# Patient Record
Sex: FEMALE | Race: WHITE | NOT HISPANIC OR LATINO | Employment: UNEMPLOYED | URBAN - NONMETROPOLITAN AREA
[De-identification: names, ages, dates, MRNs, and addresses within clinical notes are randomized per-mention and may not be internally consistent; named-entity substitution may affect disease eponyms.]

---

## 2024-02-07 ENCOUNTER — ROUTINE PRENATAL (OUTPATIENT)
Dept: OBSTETRICS AND GYNECOLOGY | Age: 24
End: 2024-02-07
Payer: MEDICAID

## 2024-02-07 VITALS — SYSTOLIC BLOOD PRESSURE: 132 MMHG | BODY MASS INDEX: 45.21 KG/M2 | DIASTOLIC BLOOD PRESSURE: 76 MMHG | WEIGHT: 293 LBS

## 2024-02-07 DIAGNOSIS — Z34.03 PRIMIGRAVIDA, THIRD TRIMESTER: ICD-10-CM

## 2024-02-07 DIAGNOSIS — Z71.85 IMMUNIZATION COUNSELING: ICD-10-CM

## 2024-02-07 DIAGNOSIS — Z11.3 ROUTINE SCREENING FOR STI (SEXUALLY TRANSMITTED INFECTION): ICD-10-CM

## 2024-02-07 DIAGNOSIS — Z13.1 SPECIAL SCREENING EXAMINATION FOR DIABETES MELLITUS: ICD-10-CM

## 2024-02-07 DIAGNOSIS — Z3A.27 27 WEEKS GESTATION OF PREGNANCY: Primary | ICD-10-CM

## 2024-02-07 DIAGNOSIS — Z13.0 SCREENING FOR DEFICIENCY ANEMIA: ICD-10-CM

## 2024-02-07 DIAGNOSIS — K02.9 DENTAL DECAY: ICD-10-CM

## 2024-02-07 LAB
GLUCOSE UR STRIP-MCNC: NEGATIVE MG/DL
PROT UR STRIP-MCNC: NEGATIVE MG/DL

## 2024-02-07 PROCEDURE — 99214 OFFICE O/P EST MOD 30 MIN: CPT | Performed by: ADVANCED PRACTICE MIDWIFE

## 2024-02-07 RX ORDER — AMOXICILLIN AND CLAVULANATE POTASSIUM 875; 125 MG/1; MG/1
1 TABLET, FILM COATED ORAL 2 TIMES DAILY
Qty: 14 TABLET | Refills: 0 | Status: SHIPPED | OUTPATIENT
Start: 2024-02-07

## 2024-02-07 NOTE — PROGRESS NOTES
Reason for visit: Routine OB visit at 27w2d     CC:  She is having increased dental pain. Endorses good fetal movement. Denies VB, LOF, contractions, h/a, visual changes, and right upper quadrant pain.     ROS: All systems reviewed and are negative with exception of the following: dental pain    Wt 293 lb for a TWG of 9.072 kg (20 lb), /76, FHTs 145  Urine today and reviewed: negative glucose, negative protein    19-week Anatomy scan: normal; anterior placenta with no evidence of placenta previa    Exam:  General Appearance:  No visualized signs of distress. Normal mood and behavior. Dental caries noted. Erythema of the gingiva.   Cardiorespiratory: HR str and reg. Lungs clear. Resp even and unlabored.  Abdomen: is soft and nontender. No CVA tenderness.   Ext: No edema. Calves non-tender.     Impression  Diagnoses and all orders for this visit:    1. 27 weeks gestation of pregnancy (Primary)  -     POC Urinalysis Dipstick  -     Gestational Screen 1 Hr ([facility])  -     Hemoglobin  -     RPR, Rfx Qn RPR / Confirm TP  -     amoxicillin-clavulanate (AUGMENTIN) 875-125 MG per tablet; Take 1 tablet by mouth 2 (Two) Times a Day.  Dispense: 14 tablet; Refill: 0    2. Primigravida, third trimester  Discussed third trimester of pregnancy, including discomforts and measures of support,  labor warnings, preeclampsia warnings, and signs and symptoms to report. Discussed glucose and hemoglobin screenings which were due to be completed today in the clinic. Labs ordered today, but she will return for the labs to be completed. Patient to notify provider and/or come to the hospital for vaginal bleeding, leaking of fluid, contractions, or other concerns. Third Trimester of Pregnancy video included in the AVS.    3. Special screening examination for diabetes mellitus  -     Gestational Screen 1 Hr ([facility])    4. Screening for deficiency anemia  -     Hemoglobin    5. Routine screening for STI (sexually transmitted  infection)  -     RPR, Rfx Qn RPR / Confirm TP    6. Dental decay  Stressed the importance of dental care/hygiene, especially during pregnancy. Antibiotic discussed and sent to the pharmacy. Discussed warm saltwater swishes.   -     amoxicillin-clavulanate (AUGMENTIN) 875-125 MG per tablet; Take 1 tablet by mouth 2 (Two) Times a Day.  Dispense: 14 tablet; Refill: 0    7. Immunization counseling  Reviewed Tdap immunization recommendations during pregnancy. Patient to consider receiving the Tdap immunization during her next prenatal visit. Tdap (Tetanus, Diptheria, Pertussis) Vaccine: What You Need to Know (VIS) education included in the AVS.          Return to the office in 2 weeks for routine prenatal visit with Dr. Carmona and as needed with concerns.        This note has been signed electronically.    Anna Hyde, DNP, APRN, CNM, RNC-OB

## 2024-02-13 ENCOUNTER — PATIENT OUTREACH (OUTPATIENT)
Dept: LABOR AND DELIVERY | Facility: HOSPITAL | Age: 24
End: 2024-02-13
Payer: MEDICAID

## 2024-02-21 ENCOUNTER — ROUTINE PRENATAL (OUTPATIENT)
Dept: OBSTETRICS AND GYNECOLOGY | Age: 24
End: 2024-02-21
Payer: MEDICAID

## 2024-02-21 VITALS — DIASTOLIC BLOOD PRESSURE: 84 MMHG | BODY MASS INDEX: 44.35 KG/M2 | SYSTOLIC BLOOD PRESSURE: 132 MMHG | WEIGHT: 287.4 LBS

## 2024-02-21 DIAGNOSIS — Z3A.29 29 WEEKS GESTATION OF PREGNANCY: Primary | ICD-10-CM

## 2024-02-21 DIAGNOSIS — Z71.85 IMMUNIZATION COUNSELING: ICD-10-CM

## 2024-02-21 DIAGNOSIS — O99.613 CONSTIPATION DURING PREGNANCY IN THIRD TRIMESTER: ICD-10-CM

## 2024-02-21 DIAGNOSIS — O99.210 OBESITY AFFECTING PREGNANCY, ANTEPARTUM, UNSPECIFIED OBESITY TYPE: ICD-10-CM

## 2024-02-21 DIAGNOSIS — Z34.03 PRIMIGRAVIDA, THIRD TRIMESTER: ICD-10-CM

## 2024-02-21 DIAGNOSIS — K59.00 CONSTIPATION DURING PREGNANCY IN THIRD TRIMESTER: ICD-10-CM

## 2024-02-21 LAB
GLUCOSE UR STRIP-MCNC: NEGATIVE MG/DL
PROT UR STRIP-MCNC: ABNORMAL MG/DL

## 2024-02-21 RX ORDER — DOCUSATE SODIUM 100 MG/1
100 CAPSULE, LIQUID FILLED ORAL 2 TIMES DAILY
Qty: 60 CAPSULE | Refills: 1 | Status: SHIPPED | OUTPATIENT
Start: 2024-02-21

## 2024-02-21 NOTE — PROGRESS NOTES
Constipation. Endorses appropriate fetal movement. Denies regular contractions, leakage of fluid, vaginal bleeding or discharge. GTT tomorrow - patient instructed in test.     Kick counts/Labor precautions  RTC 2 weeks with growth 2/2 tobacco and BMI    Diagnoses and all orders for this visit:    1. 29 weeks gestation of pregnancy (Primary)  -     POC Urinalysis Dipstick    2. Primigravida, third trimester    3. Immunization counseling  -     Tdap Vaccine => 8yo IM (BOOSTRIX)    4. Constipation during pregnancy in third trimester  -     docusate sodium (Colace) 100 MG capsule; Take 1 capsule by mouth 2 (Two) Times a Day.  Dispense: 60 capsule; Refill: 1    5. Obesity affecting pregnancy, antepartum, unspecified obesity type

## 2024-02-22 DIAGNOSIS — Z3A.29 29 WEEKS GESTATION OF PREGNANCY: Primary | ICD-10-CM

## 2024-02-23 LAB
ERYTHROCYTE [DISTWIDTH] IN BLOOD BY AUTOMATED COUNT: 11.9 % (ref 11.7–15.4)
GLUCOSE 1H P 50 G GLC PO SERPL-MCNC: 91 MG/DL (ref 70–139)
HBV SURFACE AG SERPL QL IA: NEGATIVE
HCT VFR BLD AUTO: 37.2 % (ref 34–46.6)
HCV IGG SERPL QL IA: NON REACTIVE
HGB BLD-MCNC: 12 G/DL (ref 11.1–15.9)
HIV 1+2 AB+HIV1 P24 AG SERPL QL IA: NON REACTIVE
MCH RBC QN AUTO: 29.8 PG (ref 26.6–33)
MCHC RBC AUTO-ENTMCNC: 32.3 G/DL (ref 31.5–35.7)
MCV RBC AUTO: 92 FL (ref 79–97)
PLATELET # BLD AUTO: 297 X10E3/UL (ref 150–450)
RBC # BLD AUTO: 4.03 X10E6/UL (ref 3.77–5.28)
RPR SER QL: NON REACTIVE
WBC # BLD AUTO: 13.4 X10E3/UL (ref 3.4–10.8)

## 2024-03-06 ENCOUNTER — ROUTINE PRENATAL (OUTPATIENT)
Dept: OBSTETRICS AND GYNECOLOGY | Age: 24
End: 2024-03-06
Payer: MEDICAID

## 2024-03-06 VITALS — BODY MASS INDEX: 44.84 KG/M2 | WEIGHT: 290.6 LBS | SYSTOLIC BLOOD PRESSURE: 128 MMHG | DIASTOLIC BLOOD PRESSURE: 84 MMHG

## 2024-03-06 DIAGNOSIS — Z34.03 PRIMIGRAVIDA, THIRD TRIMESTER: ICD-10-CM

## 2024-03-06 DIAGNOSIS — Z3A.31 31 WEEKS GESTATION OF PREGNANCY: Primary | ICD-10-CM

## 2024-03-06 DIAGNOSIS — O99.210 OBESITY AFFECTING PREGNANCY, ANTEPARTUM, UNSPECIFIED OBESITY TYPE: ICD-10-CM

## 2024-03-06 LAB
GLUCOSE UR STRIP-MCNC: NEGATIVE MG/DL
PROT UR STRIP-MCNC: NEGATIVE MG/DL

## 2024-03-06 NOTE — PROGRESS NOTES
No complaints. Endorses appropriate fetal movement. Denies regular contractions, leakage of fluid, vaginal bleeding or discharge.    US today:  cephalic, anterior placenta, GIANNA 9.1 cm (MVP 3.5 cm)  EFW 1661 g (27%, AC 20%)    Kick counts/Labor precautions  RTC 2 weeks  Repeat growth 37-38 weeks secondary to BMI    Diagnoses and all orders for this visit:    1. 31 weeks gestation of pregnancy (Primary)  -     POC Urinalysis Dipstick    2. Obesity affecting pregnancy, antepartum, unspecified obesity type

## 2024-03-07 LAB
AMPHETAMINES UR QL SCN: NEGATIVE NG/ML
BARBITURATES UR QL SCN: NEGATIVE NG/ML
BENZODIAZ UR QL SCN: NEGATIVE NG/ML
BZE UR QL SCN: NEGATIVE NG/ML
CANNABINOIDS UR QL SCN: POSITIVE NG/ML
CREAT UR-MCNC: 201.6 MG/DL (ref 20–300)
LABORATORY COMMENT REPORT: ABNORMAL
METHADONE UR QL SCN: NEGATIVE NG/ML
OPIATES UR QL SCN: NEGATIVE NG/ML
OXYCODONE+OXYMORPHONE UR QL SCN: NEGATIVE NG/ML
PCP UR QL: NEGATIVE NG/ML
PH UR: 7.5 [PH] (ref 4.5–8.9)
PROPOXYPH UR QL SCN: NEGATIVE NG/ML

## 2024-03-20 ENCOUNTER — TELEPHONE (OUTPATIENT)
Dept: OBSTETRICS AND GYNECOLOGY | Age: 24
End: 2024-03-20
Payer: MEDICAID

## 2024-03-20 ENCOUNTER — ROUTINE PRENATAL (OUTPATIENT)
Dept: OBSTETRICS AND GYNECOLOGY | Age: 24
End: 2024-03-20
Payer: MEDICAID

## 2024-03-20 DIAGNOSIS — Z53.21 PATIENT LEFT WITHOUT BEING SEEN: ICD-10-CM

## 2024-03-20 DIAGNOSIS — Z3A.33 33 WEEKS GESTATION OF PREGNANCY: Primary | ICD-10-CM

## 2024-03-20 NOTE — TELEPHONE ENCOUNTER
Patient checked in for her appointment ,and came back upfront after being checked in for about 20 min. Saying she was going to leave because she didn't have time to wait. I went back to talk to Anna and Tonie to let them know what she said. I was told to offer her to see Kristine. When I offered her to see Dr. Carmona she then said no because all they was going to do, is call her back and leave her in a room for another 20 min, then come in and check  baby's heartbeat. I told her she really should stay for her appointment, to make sure everything was good with baby. She stated he's good I feel him moving all the time I will see Kristine in two weeks.

## 2024-04-03 ENCOUNTER — ROUTINE PRENATAL (OUTPATIENT)
Dept: OBSTETRICS AND GYNECOLOGY | Age: 24
End: 2024-04-03
Payer: MEDICAID

## 2024-04-03 VITALS — BODY MASS INDEX: 45.06 KG/M2 | WEIGHT: 292 LBS | DIASTOLIC BLOOD PRESSURE: 86 MMHG | SYSTOLIC BLOOD PRESSURE: 136 MMHG

## 2024-04-03 DIAGNOSIS — O99.210 OBESITY AFFECTING PREGNANCY, ANTEPARTUM, UNSPECIFIED OBESITY TYPE: ICD-10-CM

## 2024-04-03 DIAGNOSIS — Z3A.35 35 WEEKS GESTATION OF PREGNANCY: Primary | ICD-10-CM

## 2024-04-03 LAB
GLUCOSE UR STRIP-MCNC: NEGATIVE MG/DL
PROT UR STRIP-MCNC: NEGATIVE MG/DL

## 2024-04-03 RX ORDER — PEN NEEDLE, DIABETIC 32GX 5/32"
NEEDLE, DISPOSABLE MISCELLANEOUS
COMMUNITY
Start: 2024-03-07

## 2024-04-03 NOTE — PROGRESS NOTES
No complaints. Endorses appropriate fetal movement. Denies regular contractions, leakage of fluid, vaginal bleeding or discharge. Denies preeclampsia symptoms.     Preeclampsia precautions  Growth at 37-38 weeks  Kick counts/Labor precautions  RTC 1 weeks    Diagnoses and all orders for this visit:    1. 35 weeks gestation of pregnancy (Primary)  -     POC Urinalysis Dipstick  -     Urine Drug Screen - Urine, Clean Catch    2. Obesity affecting pregnancy, antepartum, unspecified obesity type

## 2024-04-03 NOTE — PROGRESS NOTES
The patient left the office before care was provided and did not complete the visit. See  telephone noted.     Anna Hyde DNP, APRN, CNM, RNC-OB

## 2024-04-08 ENCOUNTER — PATIENT OUTREACH (OUTPATIENT)
Dept: LABOR AND DELIVERY | Facility: HOSPITAL | Age: 24
End: 2024-04-08
Payer: MEDICAID

## 2024-04-08 NOTE — OUTREACH NOTE
[facility]  Unable to Reach       Questions/Answers      Flowsheet Row Responses   Pending Outreach Postpartum Check-in   Call Attempt First   Outcome No answer/busy                Rox Coleman RN  Maternity Nurse Navigator    4/8/2024, 09:53 CDT

## 2024-04-09 ENCOUNTER — PATIENT OUTREACH (OUTPATIENT)
Dept: LABOR AND DELIVERY | Facility: HOSPITAL | Age: 24
End: 2024-04-09
Payer: MEDICAID

## 2024-04-09 NOTE — OUTREACH NOTE
[facility]  Check-In    Current Estimated Gestational Age: 36w1d      [employer] request sent to email on file.     Rox Coleman RN  Maternity Nurse Navigator    4/9/2024, 08:01 CDT

## 2024-04-10 ENCOUNTER — PATIENT OUTREACH (OUTPATIENT)
Dept: LABOR AND DELIVERY | Facility: HOSPITAL | Age: 24
End: 2024-04-10
Payer: MEDICAID

## 2024-04-10 ENCOUNTER — ROUTINE PRENATAL (OUTPATIENT)
Dept: OBSTETRICS AND GYNECOLOGY | Age: 24
End: 2024-04-10
Payer: MEDICAID

## 2024-04-10 ENCOUNTER — HOSPITAL ENCOUNTER (INPATIENT)
Facility: HOSPITAL | Age: 24
LOS: 3 days | Discharge: HOME | End: 2024-04-13
Attending: OBSTETRICS & GYNECOLOGY | Admitting: OBSTETRICS & GYNECOLOGY
Payer: MEDICAID

## 2024-04-10 VITALS — DIASTOLIC BLOOD PRESSURE: 90 MMHG | SYSTOLIC BLOOD PRESSURE: 136 MMHG | WEIGHT: 293 LBS | BODY MASS INDEX: 45.21 KG/M2

## 2024-04-10 DIAGNOSIS — O16.3 ELEVATED BLOOD PRESSURE AFFECTING PREGNANCY IN THIRD TRIMESTER, ANTEPARTUM: ICD-10-CM

## 2024-04-10 DIAGNOSIS — Z3A.36 36 WEEKS GESTATION OF PREGNANCY: Primary | ICD-10-CM

## 2024-04-10 LAB
ABO GROUP BLD: NORMAL
AMPHET+METHAMPHET UR QL: NEGATIVE
AMPHETAMINES UR QL: NEGATIVE
BARBITURATES UR QL SCN: NEGATIVE
BENZODIAZ UR QL SCN: NEGATIVE
BLD GP AB SCN SERPL QL: NEGATIVE
BUPRENORPHINE SERPL-MCNC: NEGATIVE NG/ML
CANNABINOIDS SERPL QL: POSITIVE
COCAINE UR QL: NEGATIVE
DEPRECATED RDW RBC AUTO: 41.7 FL (ref 37–54)
ERYTHROCYTE [DISTWIDTH] IN BLOOD BY AUTOMATED COUNT: 12.8 % (ref 12.3–15.4)
FENTANYL UR-MCNC: NEGATIVE NG/ML
GLUCOSE UR STRIP-MCNC: NEGATIVE MG/DL
HCT VFR BLD AUTO: 34.7 % (ref 34–46.6)
HGB BLD-MCNC: 11.4 G/DL (ref 12–15.9)
LDH SERPL-CCNC: 171 U/L (ref 135–214)
MCH RBC QN AUTO: 29.2 PG (ref 26.6–33)
MCHC RBC AUTO-ENTMCNC: 32.9 G/DL (ref 31.5–35.7)
MCV RBC AUTO: 89 FL (ref 79–97)
METHADONE UR QL SCN: NEGATIVE
OPIATES UR QL: NEGATIVE
OXYCODONE UR QL SCN: NEGATIVE
PCP UR QL SCN: NEGATIVE
PLATELET # BLD AUTO: 269 10*3/MM3 (ref 140–450)
PMV BLD AUTO: 10.5 FL (ref 6–12)
PROT UR STRIP-MCNC: NEGATIVE MG/DL
RBC # BLD AUTO: 3.9 10*6/MM3 (ref 3.77–5.28)
RH BLD: POSITIVE
T&S EXPIRATION DATE: NORMAL
TRICYCLICS UR QL SCN: NEGATIVE
URATE SERPL-MCNC: 6.3 MG/DL (ref 2.4–5.7)
WBC NRBC COR # BLD AUTO: 10.64 10*3/MM3 (ref 3.4–10.8)

## 2024-04-10 PROCEDURE — G0480 DRUG TEST DEF 1-7 CLASSES: HCPCS | Performed by: OBSTETRICS & GYNECOLOGY

## 2024-04-10 PROCEDURE — 83615 LACTATE (LD) (LDH) ENZYME: CPT | Performed by: OBSTETRICS & GYNECOLOGY

## 2024-04-10 PROCEDURE — 25010000002 PENICILLIN G POTASSIUM PER 600000 UNITS: Performed by: OBSTETRICS & GYNECOLOGY

## 2024-04-10 PROCEDURE — 25810000003 LACTATED RINGERS PER 1000 ML: Performed by: OBSTETRICS & GYNECOLOGY

## 2024-04-10 PROCEDURE — 86901 BLOOD TYPING SEROLOGIC RH(D): CPT | Performed by: OBSTETRICS & GYNECOLOGY

## 2024-04-10 PROCEDURE — 86850 RBC ANTIBODY SCREEN: CPT | Performed by: OBSTETRICS & GYNECOLOGY

## 2024-04-10 PROCEDURE — 80307 DRUG TEST PRSMV CHEM ANLYZR: CPT | Performed by: OBSTETRICS & GYNECOLOGY

## 2024-04-10 PROCEDURE — 84550 ASSAY OF BLOOD/URIC ACID: CPT | Performed by: OBSTETRICS & GYNECOLOGY

## 2024-04-10 PROCEDURE — 85027 COMPLETE CBC AUTOMATED: CPT | Performed by: OBSTETRICS & GYNECOLOGY

## 2024-04-10 PROCEDURE — 86900 BLOOD TYPING SEROLOGIC ABO: CPT | Performed by: OBSTETRICS & GYNECOLOGY

## 2024-04-10 PROCEDURE — 86780 TREPONEMA PALLIDUM: CPT | Performed by: OBSTETRICS & GYNECOLOGY

## 2024-04-10 RX ORDER — LABETALOL HYDROCHLORIDE 5 MG/ML
20-80 INJECTION, SOLUTION INTRAVENOUS
Status: DISCONTINUED | OUTPATIENT
Start: 2024-04-10 | End: 2024-04-11

## 2024-04-10 RX ORDER — ONDANSETRON 2 MG/ML
4 INJECTION INTRAMUSCULAR; INTRAVENOUS EVERY 6 HOURS PRN
Status: DISCONTINUED | OUTPATIENT
Start: 2024-04-10 | End: 2024-04-11 | Stop reason: HOSPADM

## 2024-04-10 RX ORDER — ACETAMINOPHEN 325 MG/1
650 TABLET ORAL EVERY 4 HOURS PRN
Status: DISCONTINUED | OUTPATIENT
Start: 2024-04-10 | End: 2024-04-11 | Stop reason: HOSPADM

## 2024-04-10 RX ORDER — SODIUM CHLORIDE 0.9 % (FLUSH) 0.9 %
10 SYRINGE (ML) INJECTION AS NEEDED
Status: DISCONTINUED | OUTPATIENT
Start: 2024-04-10 | End: 2024-04-11 | Stop reason: HOSPADM

## 2024-04-10 RX ORDER — PENICILLIN G 3000000 [IU]/50ML
3 INJECTION, SOLUTION INTRAVENOUS EVERY 4 HOURS
Status: DISCONTINUED | OUTPATIENT
Start: 2024-04-10 | End: 2024-04-11 | Stop reason: HOSPADM

## 2024-04-10 RX ORDER — SODIUM CHLORIDE, SODIUM LACTATE, POTASSIUM CHLORIDE, CALCIUM CHLORIDE 600; 310; 30; 20 MG/100ML; MG/100ML; MG/100ML; MG/100ML
125 INJECTION, SOLUTION INTRAVENOUS CONTINUOUS
Status: DISCONTINUED | OUTPATIENT
Start: 2024-04-10 | End: 2024-04-11

## 2024-04-10 RX ORDER — SODIUM CHLORIDE 9 MG/ML
40 INJECTION, SOLUTION INTRAVENOUS AS NEEDED
Status: DISCONTINUED | OUTPATIENT
Start: 2024-04-10 | End: 2024-04-11 | Stop reason: HOSPADM

## 2024-04-10 RX ORDER — HYDRALAZINE HYDROCHLORIDE 20 MG/ML
5-10 INJECTION INTRAMUSCULAR; INTRAVENOUS
Status: DISCONTINUED | OUTPATIENT
Start: 2024-04-10 | End: 2024-04-11

## 2024-04-10 RX ORDER — SODIUM CHLORIDE 0.9 % (FLUSH) 0.9 %
10 SYRINGE (ML) INJECTION EVERY 12 HOURS SCHEDULED
Status: DISCONTINUED | OUTPATIENT
Start: 2024-04-10 | End: 2024-04-11 | Stop reason: HOSPADM

## 2024-04-10 RX ORDER — NIFEDIPINE 10 MG/1
10-20 CAPSULE ORAL
Status: DISCONTINUED | OUTPATIENT
Start: 2024-04-10 | End: 2024-04-11

## 2024-04-10 RX ADMIN — SODIUM CHLORIDE, POTASSIUM CHLORIDE, SODIUM LACTATE AND CALCIUM CHLORIDE 125 ML/HR: 600; 310; 30; 20 INJECTION, SOLUTION INTRAVENOUS at 19:11

## 2024-04-10 RX ADMIN — PENICILLIN G 3 MILLION UNITS: 3000000 INJECTION, SOLUTION INTRAVENOUS at 22:08

## 2024-04-10 RX ADMIN — SODIUM CHLORIDE 5 MILLION UNITS: 900 INJECTION INTRAVENOUS at 19:59

## 2024-04-10 NOTE — H&P
[facility]  HISTORY AND PHYSICAL, OBGYN    Patient Name: Julissa Conner  : 2000  MRN: [id number]  Primary Care Physician:  Provider, No Known  Date of admission: 4/10/2024    Subjective   Subjective     Chief Complaint:   Chief Complaint   Patient presents with    Leaking Fluid     Began at 1500       HPI: Julissa Conner is a 23 y.o. year old  with an Estimated Date of Delivery: 24 currently at 36w2d presenting with leaking fluid. Nitrazine and fern positive with obvious ROM.  In clinic today in the am /90.  PIH labs pending. Pt with elevated BP on arrival here today but now blood pressures have normalized.  Pt denies PIH symptoms. BPP today .     Prenatal care has been with Dr. Duncan Carmona  It has been complicated by:  Elevated BP    ROS:  All systems were reviewed and negative except for: abdominal pain, leaking of fluid      Personal History     OB History    Para Term  AB Living   1 0 0 0 0 0   SAB IAB Ectopic Molar Multiple Live Births   0 0 0 0 0 0      # Outcome Date GA Lbr Michael/2nd Weight Sex Type Anes PTL Lv   1 Current                Past Medical History:   Diagnosis Date    Bipolar 1 disorder     Migraine        Past Surgical History:   Procedure Laterality Date    TONSILLECTOMY AND ADENOIDECTOMY      age 18       Family History: family history is not on file. Otherwise pertinent FHx was reviewed and not pertinent to current issue.    Social History:  reports that she has quit smoking. Her smoking use included cigarettes. She has never used smokeless tobacco. She reports that she does not currently use alcohol. She reports current drug use. Drug: Marijuana.    Home Medications:  Easy Feed Electric Breast Pump, [employer] RediChew Rx, aspirin, docusate sodium, and metoclopramide    Allergies:  No Known Allergies    Objective   Objective     Vitals:   Temp:  [98.2 °F (36.8 °C)] 98.2 °F (36.8 °C)  Heart Rate:  [108-120] 112  BP: (136-167)/()  139/101    Physical Exam     General: Well Developed, Well Nourished, not in acute distress   HEENT: normocephalic, atraumatic, conjunctiva non-icteric    Respiratory: non-labored, symmetrical chest rise   Gastrointestinal: gravid, soft, no TTP, no rebound tenderness or guarding to palpation, no palpable ctx   Neurologic: alert and oriented, CN II-XII grossly intact without focal deficit, DTRs 2+ lower extremities, no clonus   Psychiatric: normal mood, pleasant, cooperative  Musculoskeletal: negative calf tenderness, no lower extremity edema  Skin: warm & dry, no cyanosis, no jaundice    Pelvic Exam:  Vagina: No lesions, normal physiologic appearing discharge, no pooling/bleeding or clots, cervix visually closed  Cervix per RN: 3-4/70/-2 thin meconium    Electronic Fetal Monitoring  Baseline [id number]  and (+) Accelerations  Tocometer: irregular, every 3-8 minutes   Interpretation: reassuring and category 1    Prenatal Labs  Lab Results   Component Value Date    HGB 12.0 2024    RUBELLAABIGG 1.42 2023    HEPBSAG Negative 2024    ABSCRN Negative 2023    DNV7ZHG6 Non Reactive 2024    HEPCVIRUSABY Non Reactive 2024    GCT 91 2024    URINECX Final report 2023    CHLAMNAA Negative 2023    NGONORRHON Negative 2023         Result Review    Result Review:  I have personally reviewed the results from the time of this admission to 4/10/2024. Majority of labs pending.      Assessment & Plan   Assessment / Plan     Julissa Conner is a 23 y.o. year old  with an Estimated Date of Delivery: 24 currently at 36w2d presenting with SROM and elevated BP. .    Pregnancy, 36w2d  SROM  Elevated blood pressures which have normalized while getting admitted. PIH labs ordered. Will hold antihypertensives for now.     PLAN  -Admit for delivery. GBS unknown therefore IV PCN for prophylaxis.  Augment with pitocin as needed. Anticipate .     Yesenia Castillo,  MD  4/10/2024  18:41 CDT

## 2024-04-10 NOTE — OUTREACH NOTE
[facility]  Unable to Reach       Questions/Answers      Flowsheet Row Responses   Pending Outreach Prenatal Check-in   Call Attempt First   Outcome No answer/busy, Left message                Rox Coleman RN  Maternity Nurse Navigator    4/10/2024, 13:36 CDT

## 2024-04-10 NOTE — NURSING NOTE
Patient presents to unit with complaints of leaking of fluid since around 1500.     Thelma Montalvo RN  17:14 CDT

## 2024-04-10 NOTE — OUTREACH NOTE
[facility]  Check-In    Current Estimated Gestational Age: 36w2d      Questions/Answers      Flowsheet Row Responses   Best Method for Contacting Cell   Currently Employed No   Able to keep appointments as scheduled Yes   Gender(s) and Name(s) male   Baby Active/Feeling Fetal Movemen Yes   How are you presently feeling? states her blood pressure was elevated today at her appointment and that Dr. Carmona wants to see her Monday.  We reviewed urgent maternal warning signs and labor precautions.   New Diagnosis Pre-eclampsia   Special Considerations Birthing Ball, Peanut Ball   Supplies ready for baby Car Seat, Clothing, Crib, Diapers, Feeding Supplies   Resource/Environmental Concerns None   Do you have any questions related to your care experience, your pregnancy, plans for delivery, any concerns, etc? No            Rox Coleman RN  Maternity Nurse Navigator    4/10/2024, 14:14 CDT

## 2024-04-10 NOTE — PROGRESS NOTES
No complaints. Endorses appropriate fetal movement. Denies regular contractions, leakage of fluid, vaginal bleeding or discharge. Blood pressure elevated today. Denies preeclampsia symptoms. Repeat blood pressure last week was not elevated on recheck.     Total weight gain: 9.072 kg (20 lb)  Expected weight gain: 5 kg (11 lb)-9 kg (19 lb)    US today  Intrauterine pregnancy at 36w2d  Placental location: Anterior  Fetal position: Vertex  Biophysical profile: Reassuring    GIANNA: 11.9 cm  DVP: 4.1 cm  Fetal heart rate: 163    Check labs for PIH. Return 4/15 for evaluation (37 weeks).   Preeclampsia precautions discussed.   Patient not really interested in induction nor  delivery. Risks/benefits discussed.   Questions answered. She expressed understanding.   Kick counts/Labor precautions  RTC 1 weeks    Diagnoses and all orders for this visit:    1. 36 weeks gestation of pregnancy (Primary)  -     POC Urinalysis Dipstick  -     Chlamydia trachomatis, Neisseria gonorrhoeae, PCR w/ confirmation - Swab, Vagina  -     Strep B Screen - Swab, Vaginal/Rectum    2. Elevated blood pressure affecting pregnancy in third trimester, antepartum  -     CBC Auto Differential  -     Comprehensive Metabolic Panel  -     Lactate Dehydrogenase  -     Protein / Creatinine Ratio, Urine - Urine, Clean Catch  -     Urinalysis With Culture If Indicated - Urine, Clean Catch  -     Uric Acid

## 2024-04-11 ENCOUNTER — PATIENT OUTREACH (OUTPATIENT)
Dept: LABOR AND DELIVERY | Facility: HOSPITAL | Age: 24
End: 2024-04-11
Payer: MEDICAID

## 2024-04-11 PROBLEM — Z37.9 NORMAL LABOR: Status: ACTIVE | Noted: 2024-04-11

## 2024-04-11 LAB — T PALLIDUM IGG SER QL: NORMAL

## 2024-04-11 PROCEDURE — 25010000002 BUTORPHANOL PER 1 MG: Performed by: OBSTETRICS & GYNECOLOGY

## 2024-04-11 PROCEDURE — 25810000003 LACTATED RINGERS PER 1000 ML: Performed by: OBSTETRICS & GYNECOLOGY

## 2024-04-11 PROCEDURE — 25010000002 MORPHINE PER 10 MG: Performed by: OBSTETRICS & GYNECOLOGY

## 2024-04-11 PROCEDURE — 59410 OBSTETRICAL CARE: CPT | Performed by: OBSTETRICS & GYNECOLOGY

## 2024-04-11 PROCEDURE — 0KQM0ZZ REPAIR PERINEUM MUSCLE, OPEN APPROACH: ICD-10-PCS | Performed by: OBSTETRICS & GYNECOLOGY

## 2024-04-11 PROCEDURE — 25010000002 PENICILLIN G POTASSIUM PER 600000 UNITS: Performed by: OBSTETRICS & GYNECOLOGY

## 2024-04-11 RX ORDER — DOCUSATE SODIUM 100 MG/1
100 CAPSULE, LIQUID FILLED ORAL 2 TIMES DAILY
Status: DISCONTINUED | OUTPATIENT
Start: 2024-04-11 | End: 2024-04-13 | Stop reason: HOSPADM

## 2024-04-11 RX ORDER — OXYTOCIN/0.9 % SODIUM CHLORIDE 30/500 ML
2-20 PLASTIC BAG, INJECTION (ML) INTRAVENOUS
Status: DISCONTINUED | OUTPATIENT
Start: 2024-04-11 | End: 2024-04-11

## 2024-04-11 RX ORDER — ONDANSETRON 4 MG/1
4 TABLET, ORALLY DISINTEGRATING ORAL EVERY 8 HOURS PRN
Status: DISCONTINUED | OUTPATIENT
Start: 2024-04-11 | End: 2024-04-13 | Stop reason: HOSPADM

## 2024-04-11 RX ORDER — CALCIUM CARBONATE 500 MG/1
2 TABLET, CHEWABLE ORAL 3 TIMES DAILY PRN
Status: DISCONTINUED | OUTPATIENT
Start: 2024-04-11 | End: 2024-04-11

## 2024-04-11 RX ORDER — LIDOCAINE HYDROCHLORIDE 20 MG/ML
20 INJECTION, SOLUTION INFILTRATION; PERINEURAL ONCE AS NEEDED
Status: COMPLETED | OUTPATIENT
Start: 2024-04-11 | End: 2024-04-11

## 2024-04-11 RX ORDER — LIDOCAINE HYDROCHLORIDE 20 MG/ML
INJECTION, SOLUTION INFILTRATION; PERINEURAL
Status: COMPLETED
Start: 2024-04-11 | End: 2024-04-11

## 2024-04-11 RX ORDER — HYDROCORTISONE 25 MG/G
1 CREAM TOPICAL AS NEEDED
Status: DISCONTINUED | OUTPATIENT
Start: 2024-04-11 | End: 2024-04-13 | Stop reason: HOSPADM

## 2024-04-11 RX ORDER — IBUPROFEN 600 MG/1
600 TABLET ORAL EVERY 6 HOURS SCHEDULED
Status: DISCONTINUED | OUTPATIENT
Start: 2024-04-11 | End: 2024-04-13 | Stop reason: HOSPADM

## 2024-04-11 RX ORDER — BISACODYL 10 MG
10 SUPPOSITORY, RECTAL RECTAL DAILY PRN
Status: DISCONTINUED | OUTPATIENT
Start: 2024-04-12 | End: 2024-04-13 | Stop reason: HOSPADM

## 2024-04-11 RX ORDER — OXYTOCIN/0.9 % SODIUM CHLORIDE 30/500 ML
125 PLASTIC BAG, INJECTION (ML) INTRAVENOUS ONCE AS NEEDED
Status: DISCONTINUED | OUTPATIENT
Start: 2024-04-11 | End: 2024-04-13 | Stop reason: HOSPADM

## 2024-04-11 RX ORDER — HYDROXYZINE HYDROCHLORIDE 25 MG/1
50 TABLET, FILM COATED ORAL NIGHTLY PRN
Status: DISCONTINUED | OUTPATIENT
Start: 2024-04-11 | End: 2024-04-13 | Stop reason: HOSPADM

## 2024-04-11 RX ORDER — MORPHINE SULFATE 10 MG/ML
10 INJECTION INTRAMUSCULAR; INTRAVENOUS; SUBCUTANEOUS
Status: DISCONTINUED | OUTPATIENT
Start: 2024-04-11 | End: 2024-04-11

## 2024-04-11 RX ORDER — PRENATAL VIT/IRON FUM/FOLIC AC 27MG-0.8MG
1 TABLET ORAL DAILY
Status: DISCONTINUED | OUTPATIENT
Start: 2024-04-11 | End: 2024-04-13 | Stop reason: HOSPADM

## 2024-04-11 RX ORDER — MISOPROSTOL 200 UG/1
800 TABLET ORAL ONCE
Status: COMPLETED | OUTPATIENT
Start: 2024-04-11 | End: 2024-04-11

## 2024-04-11 RX ORDER — ONDANSETRON 2 MG/ML
4 INJECTION INTRAMUSCULAR; INTRAVENOUS EVERY 6 HOURS PRN
Status: DISCONTINUED | OUTPATIENT
Start: 2024-04-11 | End: 2024-04-13 | Stop reason: HOSPADM

## 2024-04-11 RX ORDER — TRAMADOL HYDROCHLORIDE 50 MG/1
50 TABLET ORAL EVERY 6 HOURS PRN
Status: DISCONTINUED | OUTPATIENT
Start: 2024-04-11 | End: 2024-04-13 | Stop reason: HOSPADM

## 2024-04-11 RX ORDER — ACETAMINOPHEN 325 MG/1
650 TABLET ORAL EVERY 6 HOURS
Status: DISCONTINUED | OUTPATIENT
Start: 2024-04-11 | End: 2024-04-13 | Stop reason: HOSPADM

## 2024-04-11 RX ORDER — SODIUM CHLORIDE 0.9 % (FLUSH) 0.9 %
1-10 SYRINGE (ML) INJECTION AS NEEDED
Status: DISCONTINUED | OUTPATIENT
Start: 2024-04-11 | End: 2024-04-13 | Stop reason: HOSPADM

## 2024-04-11 RX ADMIN — LIDOCAINE HYDROCHLORIDE 20 ML: 20 INJECTION, SOLUTION INFILTRATION; PERINEURAL at 09:50

## 2024-04-11 RX ADMIN — Medication: at 15:50

## 2024-04-11 RX ADMIN — BUTORPHANOL TARTRATE 1 MG: 2 INJECTION, SOLUTION INTRAMUSCULAR; INTRAVENOUS at 02:27

## 2024-04-11 RX ADMIN — PENICILLIN G 3 MILLION UNITS: 3000000 INJECTION, SOLUTION INTRAVENOUS at 02:15

## 2024-04-11 RX ADMIN — IBUPROFEN 600 MG: 600 TABLET, FILM COATED ORAL at 19:29

## 2024-04-11 RX ADMIN — HETASTARCH 500 ML: 6 INJECTION, SOLUTION INTRAVENOUS at 07:55

## 2024-04-11 RX ADMIN — MISOPROSTOL 800 MCG: 200 TABLET ORAL at 10:04

## 2024-04-11 RX ADMIN — MORPHINE SULFATE 10 MG: 10 INJECTION INTRAVENOUS at 03:57

## 2024-04-11 RX ADMIN — ACETAMINOPHEN 650 MG: 325 TABLET, FILM COATED ORAL at 17:11

## 2024-04-11 RX ADMIN — IBUPROFEN 600 MG: 600 TABLET, FILM COATED ORAL at 13:33

## 2024-04-11 RX ADMIN — PRENATAL VIT W/ FE FUMARATE-FA TAB 27-0.8 MG 1 TABLET: 27-0.8 TAB at 13:33

## 2024-04-11 RX ADMIN — ANTACID TABLETS 2 TABLET: 500 TABLET, CHEWABLE ORAL at 00:08

## 2024-04-11 RX ADMIN — PENICILLIN G 3 MILLION UNITS: 3000000 INJECTION, SOLUTION INTRAVENOUS at 06:12

## 2024-04-11 RX ADMIN — Medication 2 MILLI-UNITS/MIN: at 06:17

## 2024-04-11 RX ADMIN — SODIUM CHLORIDE, POTASSIUM CHLORIDE, SODIUM LACTATE AND CALCIUM CHLORIDE 125 ML/HR: 600; 310; 30; 20 INJECTION, SOLUTION INTRAVENOUS at 02:15

## 2024-04-11 RX ADMIN — ACETAMINOPHEN 650 MG: 325 TABLET, FILM COATED ORAL at 22:17

## 2024-04-11 NOTE — L&D DELIVERY NOTE
[facility]   Vaginal Delivery Note    Patient Name: Julissa Conner  : 2000  MRN: [id number]    Date of Delivery: 2024     Diagnosis     Pre & Post-Delivery:  Intrauterine pregnancy at 36w3d  Labor status: Spontaneous Onset of Labor     Normal labor     (normal spontaneous vaginal delivery)             Problem List    Transfer to Postpartum     Review the Delivery Report for details.     Delivery     Delivery: Vaginal, Spontaneous     YOB: 2022    Time of Birth:  Gestational Age 9:39 AM   36w3d     Anesthesia: Local     Delivering clinician: Duncan Carmona    Forceps?   No   Vacuum? No    Shoulder dystocia present: No        Delivery narrative:    The patient was noted to be completely dilated and effaced with the fetal head at the introitus. The fetal vertex was delivered cephalic spontaneously with maternal pushing efforts. No nuchal cord was identified . The right anterior shoulder was delivered atraumatically by maternal expulsive efforts\. The posterior shoulder delivered with maternal expulsive efforts. The remainder of the fetus delivered spontaneously. Upon delivery, the infant was noted to be vigorous and was passed to the mother's abdomen into the care of the awaiting Infant care team. Following a 60-second delay in cord clamping, the cord was clamped x2 and cut. Cord blood was obtained for  blood gas analysis. During the third stage of labor, IV Pitocin was administered to enhance uterine contraction. The placenta delivered spontaneously, intact, with a three-vessel cord.  The cervix, vagina and perineum were inspected for lacerations. Bilateral side wall lacerations were appreciated. 2% lidocaine injected for anesthesia. Repair with 3-O vicryl.  Hemostasis was confirmed. Cervix and vulva without lacerations. The uterine fundus was firm at the end of the procedure. Cytotec 800 mcg was placed orally. Mom and baby tolerated the delivery well. All needle, sponge, and  "instrument counts were noted to be correct x2 at the end of the procedure. Patient refusing for us to take placenta.         Infant     Findings: male  infant     Infant observations: Weight: No birth weight on file.   Length:   in  Observations/Comments:        Apgars: 8  @ 1 minute /    9  @ 5 minutes         Placenta & Cord         Placenta delivered  Spontaneous  at   4/11/2024  9:44 AM     Cord: 3 vessels  present.   Nuchal Cord?  no   Cord blood obtained: Yes    Cord gases obtained:  Yes    Cord gas results: Venous:  No results found for: \"PHCVEN\", \"BECVEN\"    Arterial:  No results found for: \"PHCART\", \"BECART\"     Repair     Episiotomy: None     No    Lacerations: Yes  Laceration Information  Laceration Repaired?   Perineal: None      Periurethral:       Labial:       Sulcus:       Vaginal: Yes  Yes    Cervical:         Suture used for repair: 3-0 Vicryl     Estimated Blood Loss:       Quantitative Blood Loss:    QBL from VAG DEL: 200 (04/11/24 0959)     Complications     none    Disposition     Mother to Mother Baby/Postpartum  in stable condition currently.  Baby to remains with mom  in stable condition currently.    Duncan Carmona MD  04/11/24  10:05 CDT        "

## 2024-04-11 NOTE — OUTREACH NOTE
[facility]  IP Postpartum    Questions/Answers      Flowsheet Row Responses   Best Method for Contacting Cell   Support Person Present Yes   Does the patient have a car seat at the hospital Yes   Delivery Note Reviewed Reviewed   Were birth expectations met? Yes   Is there a need for additional support/resources? No   Lactation Note Reviewed Reviewed   Is additional support needed? No   Any questions or concerns? No   Is the patient going to use Meds to Beds? Yes   Does patient have transportation to appointments? Yes   Any other assistance needed to ensure she is able to attend appointments? No   Does patient have supplies needed at home for  care? Breast Pump, Clothing, Crib, Formula, Diapers          At Julissa's bedside.  Infant in room.  Julissa states she has all necessary items at home for herself and baby.  Postpartum check-in appointment made and reminder given.  [facility] reminder also given.     Rox Coleman RN  Maternity Nurse Navigator    2024, 16:10 CDT

## 2024-04-11 NOTE — PAYOR COMM NOTE
" REF:  [id number]    [facility]  EDUIN,  [phone]  OR  FAX  [phone]     Lakia Conner (23 y.o. Female)       Date of Birth   2000    Social Security Number       Address   [facility] [address]    Home Phone   [phone]    MRN   [id number]       Rastafarian   None    Marital Status   Single                            Admission Date   4/10/24    Admission Type   Elective    Admitting Provider   Duncan Carmona MD    Attending Provider   Duncan Carmona MD    Department, Room/Bed   [facility] LABOR DELIVERY, L03       Discharge Date       Discharge Disposition       Discharge Destination                                 Attending Provider: Duncan Carmona MD    Allergies: No Known Allergies    Isolation: None   Infection: None   Code Status: Not on file    Ht: 170.2 cm (67\")   Wt: 134 kg (295 lb)    Admission Cmt: None   Principal Problem: Normal labor [O80,Z37.9]                   Active Insurance as of 4/10/2024       Primary Coverage       Payor Plan Insurance Group Employer/Plan Group    [place] [id number]       Payor Plan Address Payor Plan Phone Number Payor Plan Fax Number Effective Dates    HU[employer] [address] [address] [phone]  2023 - None Entered    [facility] [id number]         Subscriber Name Subscriber Birth Date Member ID       LAKIA CONNER 2000 [id number]                     Emergency Contacts        (Rel.) Home Phone Work Phone Mobile Phone    HANSEL BRITO (Significant Other) -- -- [phone]          EDC 2024   G-1 P-0   36/3  WEEKS GESTATIONAL AGE        History & Physical        Pedro Conner [[id number]]    Labor Events     labor?: No   steroids?: None  Antibiotics during labor?: Yes  Rupture date/time: 4/10/2024 1530  Rupture type: spontaneous rupture of membranes  Fluid color: meconium present  Labor type: Spontaneous Onset of Labor  Complications: " None  Presentation    Presentation: Vertex  [place] Delivery    Head delivery date/time: 2024 09:39:16  Delivery date/time: 2024  9:39 AM   Delivery type: Vaginal, Spontaneous   Details:      Operative Delivery    Forceps attempted?: No  Vacuum extractor attempted?: No                   Assessment    Living status: Living  Apgars   1 Minute: 5 Minute: 10 Minute 15 Minute 20 Minute   Skin Color: 0 1      Heart Rate: 2 2      Reflex Irritability: 2 2      Muscle Tone: 2 2      Respiratory Effort: 2 2      Total: 8 9              Apgars Assigned By: ANGELA SPRAGUE  Resuscitation    Method: Suctioning, Tactile Stimulation, Dried  Suction Details  Suction method: bulb syringe  Airway suction: mouth, nose  Oxygen Details  Measurements    No data filed  Delivery Information    Episiotomy: None  Perineal lacerations: None    Vaginal laceration: Yes Repaired: Yes   Surgical or additional est. blood loss (mL): 0  Combined est. blood loss (mL): 0         [facility]  HISTORY AND PHYSICAL, OBGYN    Patient Name: Julissa Conner  : 2000  MRN: [id number]  Primary Care Physician:  Provider, No Known  Date of admission: 4/10/2024    Subjective  Subjective     Chief Complaint:   Chief Complaint   Patient presents with    Leaking Fluid     Began at 1500       HPI: Julissa Conner is a 23 y.o. year old  with an Estimated Date of Delivery: 24 currently at 36w2d presenting with leaking fluid. Nitrazine and fern positive with obvious ROM.  In clinic today in the am /90.  PIH labs pending. Pt with elevated BP on arrival here today but now blood pressures have normalized.  Pt denies PIH symptoms. BPP today .     Prenatal care has been with Dr. Duncan Carmona  It has been complicated by:  Elevated BP    ROS:  All systems were reviewed and negative except for: abdominal pain, leaking of fluid      Personal History     OB History    Para Term  AB Living   1 0 0 0 0 0    SAB IAB Ectopic Molar Multiple Live Births   0 0 0 0 0 0      # Outcome Date GA Lbr Michael/2nd Weight Sex Type Anes PTL Lv   1 Current                Past Medical History:   Diagnosis Date    Bipolar 1 disorder     Migraine        Past Surgical History:   Procedure Laterality Date    TONSILLECTOMY AND ADENOIDECTOMY      age 18       Family History: family history is not on file. Otherwise pertinent FHx was reviewed and not pertinent to current issue.    Social History:  reports that she has quit smoking. Her smoking use included cigarettes. She has never used smokeless tobacco. She reports that she does not currently use alcohol. She reports current drug use. Drug: Marijuana.    Home Medications:  Easy Feed Electric Breast Pump, V[employer]Med[employer] RediChew Rx, aspirin, docusate sodium, and metoclopramide    Allergies:  No Known Allergies    Objective  Objective     Vitals:   Temp:  [98.2 °F (36.8 °C)] 98.2 °F (36.8 °C)  Heart Rate:  [108-120] 112  BP: (136-167)/() 139/101    Physical Exam     General: Well Developed, Well Nourished, not in acute distress   HEENT: normocephalic, atraumatic, conjunctiva non-icteric    Respiratory: non-labored, symmetrical chest rise   Gastrointestinal: gravid, soft, no TTP, no rebound tenderness or guarding to palpation, no palpable ctx   Neurologic: alert and oriented, CN II-XII grossly intact without focal deficit, DTRs 2+ lower extremities, no clonus   Psychiatric: normal mood, pleasant, cooperative  Musculoskeletal: negative calf tenderness, no lower extremity edema  Skin: warm & dry, no cyanosis, no jaundice    Pelvic Exam:  Vagina: No lesions, normal physiologic appearing discharge, no pooling/bleeding or clots, cervix visually closed  Cervix per RN: 3-4/70/-2 thin meconium    Electronic Fetal Monitoring  Baseline [id number]  and (+) Accelerations  Tocometer: irregular, every 3-8 minutes   Interpretation: reassuring and category 1    Prenatal Labs  Lab Results   Component Value Date     HGB 12.0 2024    RUBELLAABIGG 1.42 2023    HEPBSAG Negative 2024    ABSCRN Negative 2023    PPR3FXU4 Non Reactive 2024    HEPCVIRUSABY Non Reactive 2024    GCT 91 2024    URINECX Final report 2023    CHLAMNAA Negative 2023    NGONORRHON Negative 2023         Result Review   Result Review:  I have personally reviewed the results from the time of this admission to 4/10/2024. Majority of labs pending.      Assessment & Plan  Assessment / Plan     Julissa Conner is a 23 y.o. year old  with an Estimated Date of Delivery: 24 currently at 36w2d presenting with SROM and elevated BP. .    Pregnancy, 36w2d  SROM  Elevated blood pressures which have normalized while getting admitted. PIH labs ordered. Will hold antihypertensives for now.     PLAN  -Admit for delivery. GBS unknown therefore IV PCN for prophylaxis.  Augment with pitocin as needed. Anticipate .     Yesenia Castillo MD  4/10/2024  18:41 CDT     Electronically signed by Yesenia Castillo MD at 04/10/24 1853          Operative/Procedure Notes (last 48 hours)        Duncan Carmona MD at 24 1005           [facility]   Vaginal Delivery Note    Patient Name: Julissa Conner  : 2000  MRN: [id number]    Date of Delivery: 2024     Diagnosis     Pre & Post-Delivery:  Intrauterine pregnancy at 36w3d  Labor status: Spontaneous Onset of Labor     Normal labor     (normal spontaneous vaginal delivery)             Problem List    Transfer to Postpartum     Review the Delivery Report for details.     Delivery     Delivery: Vaginal, Spontaneous     YOB: 2022    Time of Birth:  Gestational Age 9:39 AM   36w3d     Anesthesia: Local     Delivering clinician: Duncan Carmona    Forceps?   No   Vacuum? No    Shoulder dystocia present: No        Delivery narrative:    The patient was noted to be completely dilated and effaced with the fetal head at the introitus.  "The fetal vertex was delivered cephalic spontaneously with maternal pushing efforts. No nuchal cord was identified . The right anterior shoulder was delivered atraumatically by maternal expulsive efforts\. The posterior shoulder delivered with maternal expulsive efforts. The remainder of the fetus delivered spontaneously. Upon delivery, the infant was noted to be vigorous and was passed to the mother's abdomen into the care of the awaiting Infant care team. Following a 60-second delay in cord clamping, the cord was clamped x2 and cut. Cord blood was obtained for  blood gas analysis. During the third stage of labor, IV Pitocin was administered to enhance uterine contraction. The placenta delivered spontaneously, intact, with a three-vessel cord.  The cervix, vagina and perineum were inspected for lacerations. Bilateral side wall lacerations were appreciated. 2% lidocaine injected for anesthesia. Repair with 3-O vicryl.  Hemostasis was confirmed. Cervix and vulva without lacerations. The uterine fundus was firm at the end of the procedure. Cytotec 800 mcg was placed orally. Mom and baby tolerated the delivery well. All needle, sponge, and instrument counts were noted to be correct x2 at the end of the procedure. Patient refusing for us to take placenta.         Infant     Findings: male  infant     Infant observations: Weight: No birth weight on file.   Length:   in  Observations/Comments:        Apgars: 8  @ 1 minute /    9  @ 5 minutes         Placenta & Cord         Placenta delivered  Spontaneous  at   4/11/2024  9:44 AM     Cord: 3 vessels  present.   Nuchal Cord?  no   Cord blood obtained: Yes    Cord gases obtained:  Yes    Cord gas results: Venous:  No results found for: \"PHCVEN\", \"BECVEN\"    Arterial:  No results found for: \"PHCART\", \"BECART\"     Repair     Episiotomy: None     No    Lacerations: Yes  Laceration Information  Laceration Repaired?   Perineal: None      Periurethral:       Labial:       Sulcus:  "      Vaginal: Yes  Yes    Cervical:         Suture used for repair: 3-0 Vicryl     Estimated Blood Loss:       Quantitative Blood Loss:    QBL from VAG DEL: 200 (24 0959)     Complications     none    Disposition     Mother to Mother Baby/Postpartum  in stable condition currently.  Baby to remains with mom  in stable condition currently.    Duncan Carmona MD  24  10:05 CDT          Electronically signed by Duncan Carmona MD at 24 1007          Physician Progress Notes (last 48 hours)        Duncan Carmona MD at 24 0737              Duncan Carmona MD  [facility]  [address]  [place] [address]  Office [phone]  Fax [phone]      [facility]  Julissa Conner  : 2000  MRN: [id number]  CSN: [id number]    Labor progress note    Subjective   She reports is feeling painful contraction. Denies preeclampsia symptoms.     Objective   Min/max vitals past 24 hours:  Temp  Min: 98 °F (36.7 °C)  Max: 98.5 °F (36.9 °C)   BP  Min: 104/58  Max: 167/120   Pulse  Min: 77  Max: 120   Resp  Min: 18  Max: 18        FHT's: reactive, reassuring and category  1-2, intermittent variables .     Cervix: was not checked.   Contractions: irregular every 2-5 minutes     Assessment   23 year-old G1 admitted following SROM yesterday. She refused induction/augmentation overnight. This morning, she was agreeable to augmentation with pitocin after minimal cervical change. She has previously voiced that she does not want a  delivery. I have reviewed with her again about  delivery in terms of emergency situations. Questions answered. She expressed understanding.     IUP at 36w3d  SROM  Labor  Gestational HTN  GBS unknown  THC use in pregnancy  Tobacco use in early pregnancy  Bipolar disorder complicating pregnancy    Plan   Augment with pitocin  Continue with augmentation  OK for epidural  Allow labor to continue pending maternal and fetal status  Continue ABX for GBS  Plan  discussed with family and questions answered.  Understanding verbalized.    Duncan Carmona MD  2024  07:37 CDT             Electronically signed by Duncan Carmona MD at 24       Alisa Schofield MD at 04/10/24 1948           Sunita Conner  : 2000  MRN: [id number]  CSN: [id number]    Labor & Delivery JEN turnover progress note    Subjective   Chief Complaint: Ruptured of membranes admitted by Dr. Castillo, assuming labor management overnight.    External Records Reviewed: Prenatal history in Epic reviewed, pregnancy complicated by: gestational hypertension,  rupture of membranes, bipolar disorder    Review Previous Test Results: Prenatal labs in [facility] reviewed, history of: normal PIH labs    Independent Historian: none    Social Determinants to Health: No drug, transportation or housing or other issues noted      Objective   Medical Decision Making:  Amount/Complexity of Data Reviewed  -Labs ordered - fern/nitrazine  -Imaging ordered - none  -IV fluids given - yes  Risk:  Prescription drug management - declines induction/pitocin    Min/max vitals past 24 hours:  Temp  Min: 98.2 °F (36.8 °C)  Max: 98.2 °F (36.8 °C)   BP  Min: 136/90  Max: 167/120   Pulse  Min: 108  Max: 120   No data recorded        Independent Interpretation FHT's: reassuring and category 1.  external monitors used   Cervix: was checked (by Dr. Castillo): 3 cm / 70 % / -2   Contractions:    Review of current test: results irregular         Assessment   IUP at 36w2d  Rupture of membranes  Declines induction    Plan   Expectant management    Alisa Schofield MD  4/10/2024  19:49 CDT      Electronically signed by Alisa Schofield MD at 04/10/24 1952

## 2024-04-11 NOTE — PLAN OF CARE
Goal Outcome Evaluation:  Plan of Care Reviewed With: patient, family        Progress: improving          VSS. Voiding and ambulating. +THC on admission, mother had been educated on recommendation but she still wishes to breastfeed. ERAS give, PRN medication not requested at this time. FF, ML, U1, scant lochia. Responding well to infant cues.

## 2024-04-11 NOTE — PROGRESS NOTES
Duncan Carmona MD  [facility]  [address]  [place] [address]  Office [phone]  Fax [phone]      [facility]  Julissa Conner  : 2000  MRN: [id number]  CSN: [id number]    Labor progress note    Subjective   She reports is feeling painful contraction. Denies preeclampsia symptoms.      Objective   Min/max vitals past 24 hours:  Temp  Min: 98 °F (36.7 °C)  Max: 98.5 °F (36.9 °C)   BP  Min: 104/58  Max: 167/120   Pulse  Min: 77  Max: 120   Resp  Min: 18  Max: 18        FHT's: reactive, reassuring and category  1-2, intermittent variables .     Cervix: was not checked.   Contractions: irregular every 2-5 minutes      Assessment   23 year-old G1 admitted following SROM yesterday. She refused induction/augmentation overnight. This morning, she was agreeable to augmentation with pitocin after minimal cervical change. She has previously voiced that she does not want a  delivery. I have reviewed with her again about  delivery in terms of emergency situations. Questions answered. She expressed understanding.     IUP at 36w3d  SROM  Labor  Gestational HTN  GBS unknown  THC use in pregnancy  Tobacco use in early pregnancy  Bipolar disorder complicating pregnancy     Plan   Augment with pitocin  Continue with augmentation  OK for epidural  Allow labor to continue pending maternal and fetal status  Continue ABX for GBS  Plan discussed with family and questions answered.  Understanding verbalized.    Duncan Carmona MD  2024  07:37 CDT

## 2024-04-11 NOTE — PROGRESS NOTES
Sunita Conner  : 2000  MRN: [id number]  CSN: [id number]    Labor & Delivery JEN turnover progress note    Subjective   Chief Complaint: Ruptured of membranes admitted by Dr. Castillo, assuming labor management overnight.    External Records Reviewed: Prenatal history in Epic reviewed, pregnancy complicated by: gestational hypertension,  rupture of membranes, bipolar disorder    Review Previous Test Results: Prenatal labs in [facility] reviewed, history of: normal PIH labs    Independent Historian: none    Social Determinants to Health: No drug, transportation or housing or other issues noted       Objective   Medical Decision Making:  Amount/Complexity of Data Reviewed  -Labs ordered - fern/nitrazine  -Imaging ordered - none  -IV fluids given - yes  Risk:  Prescription drug management - declines induction/pitocin    Min/max vitals past 24 hours:  Temp  Min: 98.2 °F (36.8 °C)  Max: 98.2 °F (36.8 °C)   BP  Min: 136/90  Max: 167/120   Pulse  Min: 108  Max: 120   No data recorded        Independent Interpretation FHT's: reassuring and category 1.  external monitors used   Cervix: was checked (by Dr. Castillo): 3 cm / 70 % / -2   Contractions:    Review of current test: results irregular          Assessment   IUP at 36w2d  Rupture of membranes  Declines induction     Plan   Expectant management    Alisa Schofield MD  4/10/2024  19:49 CDT

## 2024-04-11 NOTE — LACTATION NOTE
Mother's Name: Julissa Phone #: [phone]  Infant Name: Toby  : 24  Gestation: 36w3  Day of life:  Birth weight:    Discharge weight:  Weight Loss:   24 hour Summary of Feeds:  Voids:  Stools:  Assistive devices (shields, shells, etc):  Significant Maternal history: , Bipolar 1 Disorder, Migraines, THC + , 3/24, and 24  Maternal Concerns: None   Maternal Goal: Breastfeed  Mother's Medications: PNV, ASA  Breastpump for home: Spectra  Ped follow up appt:     Called to LDR to assist with first breastfeeding session after delivery. Infant skin to skin with patient upon visit. Family at the bedside assisting with latching. Discussed positive UDS for THC with patient, explaining it is not recommended to breastfeed while using marijuana. Patient appeared surprised and stated she had stopped at 28 weeks pregnant. Handout regarding marijuana and breastfeeding given (see below). Though breastfeeding is not recommended at this time, patient chooses to breastfeed despite discussion. Assisted with positioning, hand expressing, and latching. Able to easily express large drops of colostrum. Infant latched well with intermittent deep jaw drops observed. Initial breastfeeding education provided. Discussed infant's risk for hypoglycemia, interventions to maintain BG, signs of nutritive sucking, and the need to not further expose infant to marijuana. Recommended she keep infant skin to skin, hand express often, and call for assistance as needed. Understanding and appreciation verbalized. Questions denied.     1430  Called to room to assist. Patient holding infant skin to skin upon visit. States infant will not wake to breastfeed at this time. Infant asleep. With permission, assisted with waking techniques. Infant aroused but quickly feel asleep once placed on patient. Infant showing no hunger cues or making any effort despite efforts. Patient states she was trying 30 minutes prior to calling lactation.  Assisted with hand expressing while infant remained on patient's chest. Collected 5.5 ml and syringe fed all over 15 minutes. Discussed the normalcy of this due to gestational age, waking techniques, hand expressing, and feeding amounts. Recommended hand expressing anytime infant will not wake to breastfeed with a goal of 5 ml. Support provided. Understanding verbalized. Questions denied.     Instructed patient our lactation team is here for continued support throughout their breastfeeding journey. Our team has encouraged patient to call with any questions or concerns that may arise after discharge.    Is it safe to use marijuana while breastfeeding?  By Sandi Tucker   Reviewed by Sammie Yeh, PhD, MSN, RN, IBCLC, BRUCE-BC, CHT  https://www.medicalnewstoday.com/articles/956855.php    Breastfeeding and Diaper Chart  Check List for Essentials of Positioning And Latch-on handout provided by Lactation Education Resources  Hand Expression handout provided by Lactation Education Resources  Five Keys to Successful Breastfeeding handout provided by Lactation Education Resources    The Many Benefits if Breastfeeding handout given  Breastfeeding saves time  *Breastfeeding allows you to calm or feed your baby immediately, which leads to a happier baby who cries less  *There is nothing to buy, prepare, or maintain.There is nothing to clean or sterilize.  Breastfeeding builds a mothers confidence  *She knows all her baby needs to thrive is her!  Breastfeeding saves Money  *There is no formula to buy and healthier breast fed babies have less medical costs  Healthy Mom/Healthy baby  * babies get sick less often, and when they do they are usually sick less severely and for a shorter time  * babies have fewer ear infections  * babies have fewer allergies  *Mothers who breastfeed have a lower risk for cancer, osteoporosis, anemia, high blood pressure, obesity, and Type ll diabetes  *Mothers miss less  work days with sick babies  Breast fed babies have a better dental health  * babies have better jaw development which requires lest orthodontic work  *Breast milk does not promote cavities  * babies can nurse at night without worry of tooth decay  Breastfeeding allows a baby to reach his full IQ potential  *The longer a baby is breast fed, the better their brain development  Breast fed babies and moms are more relaxed  *The hormones released during breastfeeding have a calming effect on mothers  *Breastfeeding requires mom to take a break; this may help mom get more rest after delivery  *Breastfeeding is quicker than preparing formula which allows mom and baby to get back to sleep faster  *Breastfeeding promotes bonding and allows mom to learn babies cues and care needs more quickly  Breastfeeding cleanup is easier  *The bowel movements and spit up of breast fed babies doesn't smell as bad  *Spit-up of breast fed babies doesn't stain clothing  Getting out of the hourse is easier  *No formula bottles to prepare and carry safely   *No time restraints due to worry about what baby will eat  *No worries about warming a bottle or finding safe water to prepare bottles  Breastfeeding mother get their bodies back sooner  *The uterus shrinks more quickly and completely, which allows a flatter tummy  *Breastfeeding burns 400-500 calories a day; making milk torches stored fat!  Breastfeeding is better for the environment  *There is no trash to dispose of after breastfeeding  *There is no production facility to produce breast milk; moms body does it all without the pollution of a factory    Your Guide to Breastfeeding Booklet by [employer], www.[employer]    Safe Storage of Breastmilk magnet: [employer]    Educational Breastfeeding Videos on   YouTube  (length of video in minutes)    Expressing the First Milk - Small Baby Series (7:19)  Hand Expression [facility]  (7:34)  Attaching Your Baby at the Breast - Breastfeeding Series (10:26)  The Power of Pumping - [facility]   Maximizing Production [facility] (9:35)  Instructions for use Medela Symphony breastpump (English) (1:58)  Medela 2-Phase Expression (4:05)  Medela double pumping video (2:19)  Choosing your PersonalFit breast shield size (3:04)  We also recommend visiting www.[employer] for valuable education and videos on breastfeeding full term AND  infants. This is a great resource to begin learning about breastfeeding during pregnancy as well.                [facility]             [phone]

## 2024-04-11 NOTE — PLAN OF CARE
Goal Outcome Evaluation:         Pt is a , 36 3/7; Pt presented to LDR with SROM at 1530, pt stated it was yellow in color; Pt requested to labor without any augmention/induction agents through the night; Pt was not able to tolerate the birthing ball when attempted; Pt has rested in bed with family at bedside; Pt has been given stadol and morphine for pain; Pt has received Pen G for unknown GBS; Pt does not want an epidural at this time; Pts last cervical exam: -90%/-2; VSS.

## 2024-04-11 NOTE — CASE MANAGEMENT/SOCIAL WORK
Continued Stay Note   [place]     Patient Name: Julissa Conner  MRN: [id number]  Today's Date: 4/11/2024    Admit Date: 4/10/2024        Discharge Plan       Row Name 04/11/24 8218       Plan    Plan Comments Social service consult received stating mom's UDS was postive for THC upon admission.  Will await results of baby's drug testing results to proceed.                   Discharge Codes    No documentation.                       SWAPNIL Hanson

## 2024-04-12 LAB
HCT VFR BLD AUTO: 32 % (ref 34–46.6)
HGB BLD-MCNC: 10.8 G/DL (ref 12–15.9)

## 2024-04-12 PROCEDURE — 85018 HEMOGLOBIN: CPT | Performed by: OBSTETRICS & GYNECOLOGY

## 2024-04-12 PROCEDURE — 85014 HEMATOCRIT: CPT | Performed by: OBSTETRICS & GYNECOLOGY

## 2024-04-12 RX ADMIN — ACETAMINOPHEN 650 MG: 325 TABLET, FILM COATED ORAL at 22:07

## 2024-04-12 RX ADMIN — IBUPROFEN 600 MG: 600 TABLET, FILM COATED ORAL at 01:45

## 2024-04-12 RX ADMIN — ACETAMINOPHEN 650 MG: 325 TABLET, FILM COATED ORAL at 10:32

## 2024-04-12 RX ADMIN — ACETAMINOPHEN 650 MG: 325 TABLET, FILM COATED ORAL at 16:59

## 2024-04-12 RX ADMIN — ACETAMINOPHEN 650 MG: 325 TABLET, FILM COATED ORAL at 04:45

## 2024-04-12 RX ADMIN — IBUPROFEN 600 MG: 600 TABLET, FILM COATED ORAL at 07:50

## 2024-04-12 RX ADMIN — IBUPROFEN 600 MG: 600 TABLET, FILM COATED ORAL at 19:44

## 2024-04-12 RX ADMIN — PRENATAL VIT W/ FE FUMARATE-FA TAB 27-0.8 MG 1 TABLET: 27-0.8 TAB at 07:49

## 2024-04-12 RX ADMIN — IBUPROFEN 600 MG: 600 TABLET, FILM COATED ORAL at 14:03

## 2024-04-12 NOTE — LACTATION NOTE
Mother's Name: Julissa Phone #: [phone]  Infant Name: Toby  : 24  Gestation: 36w3  Day of life: 1  Birth weight: 6-1 (2750g)  Discharge weight:  Weight Loss: -4.5%  24 hour Summary of Feeds: 2 BF + Attempts + 20.6 EBM  Voids: 5  Stools: 4 Emesis: 1  Assistive devices (shields, shells, etc): Attempted feedings with nipple shield through the night due to feeding difficulty.  Significant Maternal history: , Bipolar 1 Disorder, Migraines, THC + , 3/24, and 24  Maternal Concerns: None   Maternal Goal: Breastfeed  Mother's Medications: PNV, ASA  Breastpump for home: Spectra  Ped follow up appt:     Patient reports infant will latch well but will not stay awake to feed. States she even tried to use a nipple shield, however, this did not help. Listened and provided encouragement as patient is hand expressing and feeding EBM when needed. Reiterated this was common due to gestational age. Feedings will improve with time. Discussed infant weight loss and output. Recommended pumping after feedings today to increase stimulation, though she may not collect much. Recommended to continue hand expressing in addition to pumping to provide more volume to infant. Offered to set up hospital pump for use. Patient would like to use her own. Lactation support offered throughout the day. Understanding verbalized. Questions denied at this time.     Instructed patient our lactation team is here for continued support throughout their breastfeeding journey. Our team has encouraged patient to call with any questions or concerns that may arise after discharge.

## 2024-04-12 NOTE — PROGRESS NOTES
"      DARCIE Ruvalcaba  [facility]  [address]  [place] [address]  Office [phone]  Fax [phone]    [facility]  Vaginal Delivery Progress Note    Subjective   Postpartum Day 1: Vaginal Delivery    The patient feels well.  Her pain is well controlled with scheduled Tylenol and Motrin.   She is ambulating well.  Patient describes her bleeding as thin lochia.    Breastfeeding: infant latching with difficulty.    Objective     Vital Signs Range for the last 24 hours  Temperature: Temp:  [97 °F (36.1 °C)-100.4 °F (38 °C)] 97 °F (36.1 °C)   Temp Source: Temp src: Temporal   BP: BP: (108-140)/(68-98) 119/71   Pulse: Heart Rate:  [] 80   Respirations: Resp:  [16-20] 18   SPO2: SpO2:  [98 %-99 %] 99 %   O2 Amount (l/min):     O2 Devices Device (Oxygen Therapy): room air   Weight:       Admit Height:  Height: 170.2 cm (67\")      Physical Exam:  General:  no acute distresss.  Abdomen: abdomen is soft without significant tenderness, masses, organomegaly or guarding. Fundus: appropriate, firm, non tender  Extremities: normal, atraumatic, no cyanosis, and trace edema.       Lab results reviewed:  Yes   Rubella:  No results found for: \"RUBELLAIGGIN\" Nurse Transcribed from prenatal record --  No components found for: \"EXTRUBELQUAL\"  Rh Status:    RH type   Date Value Ref Range Status   04/10/2024 Positive  Corrected     Comment:     PRIOR ABORH RECORDS NOT AVAILABLE FOR VERIFICATION     Immunizations:   Immunization History   Administered Date(s) Administered    DTaP, Unspecified 2002, 08/23/2006    Fluzone (or Fluarix & Flulaval for VFC) >6mos 11/16/2023    Hep B / HiB 2002    IPV 2002, 08/23/2006    MMR 08/23/2006    PEDS-Pneumococcal Conjugate (PCV7) 2002    Tdap 02/21/2024     Lab Results (last 24 hours)       Procedure Component Value Units Date/Time    Hemoglobin & Hematocrit, Blood [[id number]]  (Abnormal) Collected: 04/12/24 0533    Specimen: Blood Updated: 04/12/24 " 0542     Hemoglobin 10.8 g/dL      Hematocrit 32.0 %             External Prenatal Results       Pregnancy Outside Results - Transcribed From Office Records - See Scanned Records For Details       Test Value Date Time    ABO  A  04/10/24 1900    Rh  Positive  04/10/24 1900    Antibody Screen  Negative  04/10/24 1900       Negative  09/21/23 1033    Varicella IgG  248 index 09/21/23 1033    Rubella  1.42 index 09/21/23 1033    Hgb  10.8 g/dL 04/12/24 0533       11.4 g/dL 04/10/24 1900       12.0 g/dL 02/22/24 1411       14.1 g/dL 09/21/23 1033    Hct  32.0 % 04/12/24 0533       34.7 % 04/10/24 1900       37.2 % 02/22/24 1411       42.9 % 09/21/23 1033    Glucose Fasting GTT       Glucose Tolerance Test 1 hour       Glucose Tolerance Test 3 hour       Gonorrhea (discrete)  Negative  09/21/23 1033    Chlamydia (discrete)  Negative  09/21/23 1033    RPR  Non Reactive  02/22/24 1411       Non Reactive  09/21/23 1033    VDRL       Syphilis Antibody       HBsAg  Negative  02/22/24 1411       Negative  09/21/23 1033    Herpes Simplex Virus PCR       Herpes Simplex VIrus Culture       HIV  Non Reactive  02/22/24 1411       Non Reactive  09/21/23 1033    Hep C RNA Quant PCR       Hep C Antibody  Non Reactive  02/22/24 1411    AFP       Group B Strep       GBS Susceptibility to Clindamycin       GBS Susceptibility to Erythromycin       Fetal Fibronectin       Genetic Testing, Maternal Blood                 Drug Screening       Test Value Date Time    Urine Drug Screen       Amphetamine Screen  Negative  04/10/24 1855       Negative ng/mL 03/06/24 1102    Barbiturate Screen  Negative  04/10/24 1855       Negative ng/mL 03/06/24 1102    Benzodiazepine Screen  Negative  04/10/24 1855       Negative ng/mL 03/06/24 1102    Methadone Screen  Negative  04/10/24 1855       Negative ng/mL 03/06/24 1102    Phencyclidine Screen  Negative  04/10/24 1855       Negative ng/mL 03/06/24 1102    Opiates Screen  Negative  04/10/24 1855    THC  Screen  Positive  04/10/24 1855    Cocaine Screen       Propoxyphene Screen  Negative ng/mL 24 1102    Buprenorphine Screen  Negative  04/10/24 1855    Methamphetamine Screen       Oxycodone Screen  Negative  04/10/24 1855    Tricyclic Antidepressants Screen  Negative  04/10/24 1855              Legend    ^: Historical                            Assessment & Plan       Normal labor     (normal spontaneous vaginal delivery)      Julissalove Conner is Day 1  post-partum  Vaginal, Spontaneous   .      Plan: Continue current postpartum plan of care.  Lactation support today with latching and positioning.      This note has been signed electronically.    Anna Hyde, DNP, APRN, CNM, RNC-OB  2024  08:41 CDT

## 2024-04-12 NOTE — CASE MANAGEMENT/SOCIAL WORK
Continued Stay Note   [place]     Patient Name: Julissa Conner  MRN: [id number]  Today's Date: 4/12/2024    Admit Date: 4/10/2024        Discharge Plan       Row Name 04/12/24 0927       Plan    Plan Comments Baby's UDS was not positive for THC.  Will continue to follow for cord results.                   Discharge Codes    No documentation.                       SWAPNIL Hanson

## 2024-04-13 VITALS
SYSTOLIC BLOOD PRESSURE: 125 MMHG | HEIGHT: 67 IN | WEIGHT: 293 LBS | RESPIRATION RATE: 18 BRPM | TEMPERATURE: 97.9 F | HEART RATE: 89 BPM | OXYGEN SATURATION: 99 % | BODY MASS INDEX: 45.99 KG/M2 | DIASTOLIC BLOOD PRESSURE: 76 MMHG

## 2024-04-13 LAB
ALBUMIN SERPL-MCNC: 3.4 G/DL (ref 4–5)
ALBUMIN/GLOB SERPL: 1.3 {RATIO} (ref 1.2–2.2)
ALP SERPL-CCNC: 92 IU/L (ref 44–121)
ALT SERPL-CCNC: 13 IU/L (ref 0–32)
APPEARANCE UR: ABNORMAL
AST SERPL-CCNC: 18 IU/L (ref 0–40)
BACTERIA #/AREA URNS HPF: ABNORMAL /[HPF]
BACTERIA UR CULT: NORMAL
BACTERIA UR CULT: NORMAL
BASOPHILS # BLD AUTO: 0 X10E3/UL (ref 0–0.2)
BASOPHILS NFR BLD AUTO: 0 %
BILIRUB SERPL-MCNC: <0.2 MG/DL (ref 0–1.2)
BILIRUB UR QL STRIP: NEGATIVE
BUN SERPL-MCNC: 4 MG/DL (ref 6–20)
BUN/CREAT SERPL: 6 (ref 9–23)
C TRACH RRNA SPEC QL NAA+PROBE: NEGATIVE
CALCIUM SERPL-MCNC: 9.1 MG/DL (ref 8.7–10.2)
CASTS URNS QL MICRO: ABNORMAL /LPF
CHLORIDE SERPL-SCNC: 103 MMOL/L (ref 96–106)
CO2 SERPL-SCNC: 19 MMOL/L (ref 20–29)
COLOR UR: YELLOW
CREAT SERPL-MCNC: 0.7 MG/DL (ref 0.57–1)
CREAT UR-MCNC: 124.8 MG/DL
EGFRCR SERPLBLD CKD-EPI 2021: 126 ML/MIN/1.73
EOSINOPHIL # BLD AUTO: 0 X10E3/UL (ref 0–0.4)
EOSINOPHIL NFR BLD AUTO: 0 %
EPI CELLS #/AREA URNS HPF: >10 /HPF (ref 0–10)
ERYTHROCYTE [DISTWIDTH] IN BLOOD BY AUTOMATED COUNT: 12.1 % (ref 11.7–15.4)
GLOBULIN SER CALC-MCNC: 2.7 G/DL (ref 1.5–4.5)
GLUCOSE SERPL-MCNC: 92 MG/DL (ref 70–99)
GLUCOSE UR QL STRIP: NEGATIVE
GP B STREP DNA SPEC QL NAA+PROBE: NEGATIVE
HCT VFR BLD AUTO: 37 % (ref 34–46.6)
HGB BLD-MCNC: 12 G/DL (ref 11.1–15.9)
HGB UR QL STRIP: NEGATIVE
IMM GRANULOCYTES # BLD AUTO: 0.1 X10E3/UL (ref 0–0.1)
IMM GRANULOCYTES NFR BLD AUTO: 1 %
KETONES UR QL STRIP: ABNORMAL
LDH SERPL L TO P-CCNC: 173 IU/L (ref 119–226)
LEUKOCYTE ESTERASE UR QL STRIP: ABNORMAL
LYMPHOCYTES # BLD AUTO: 1.4 X10E3/UL (ref 0.7–3.1)
LYMPHOCYTES NFR BLD AUTO: 13 %
MCH RBC QN AUTO: 29.3 PG (ref 26.6–33)
MCHC RBC AUTO-ENTMCNC: 32.4 G/DL (ref 31.5–35.7)
MCV RBC AUTO: 91 FL (ref 79–97)
MICRO URNS: ABNORMAL
MONOCYTES # BLD AUTO: 0.8 X10E3/UL (ref 0.1–0.9)
MONOCYTES NFR BLD AUTO: 8 %
MUCOUS THREADS URNS QL MICRO: PRESENT
N GONORRHOEA RRNA SPEC QL NAA+PROBE: NEGATIVE
NEUTROPHILS # BLD AUTO: 8.2 X10E3/UL (ref 1.4–7)
NEUTROPHILS NFR BLD AUTO: 78 %
NITRITE UR QL STRIP: NEGATIVE
PH UR STRIP: 8 [PH] (ref 5–7.5)
PLATELET # BLD AUTO: 267 X10E3/UL (ref 150–450)
POTASSIUM SERPL-SCNC: 4 MMOL/L (ref 3.5–5.2)
PROT SERPL-MCNC: 6.1 G/DL (ref 6–8.5)
PROT UR QL STRIP: ABNORMAL
PROT UR-MCNC: 18.4 MG/DL
PROT/CREAT UR: 147 MG/G CREAT (ref 0–200)
RBC # BLD AUTO: 4.09 X10E6/UL (ref 3.77–5.28)
RBC #/AREA URNS HPF: ABNORMAL /HPF (ref 0–2)
SODIUM SERPL-SCNC: 137 MMOL/L (ref 134–144)
SP GR UR STRIP: 1.01 (ref 1–1.03)
URATE SERPL-MCNC: 6.1 MG/DL (ref 2.6–6.2)
URINALYSIS REFLEX: ABNORMAL
UROBILINOGEN UR STRIP-MCNC: 0.2 MG/DL (ref 0.2–1)
WBC # BLD AUTO: 10.5 X10E3/UL (ref 3.4–10.8)
WBC #/AREA URNS HPF: ABNORMAL /HPF (ref 0–5)

## 2024-04-13 RX ORDER — IBUPROFEN 800 MG/1
800 TABLET ORAL EVERY 6 HOURS PRN
Qty: 30 TABLET | Refills: 0 | Status: SHIPPED | OUTPATIENT
Start: 2024-04-13

## 2024-04-13 RX ADMIN — IBUPROFEN 600 MG: 600 TABLET, FILM COATED ORAL at 02:13

## 2024-04-13 RX ADMIN — ACETAMINOPHEN 650 MG: 325 TABLET, FILM COATED ORAL at 04:18

## 2024-04-13 RX ADMIN — IBUPROFEN 600 MG: 600 TABLET, FILM COATED ORAL at 08:12

## 2024-04-13 RX ADMIN — IBUPROFEN 600 MG: 600 TABLET, FILM COATED ORAL at 14:57

## 2024-04-13 RX ADMIN — PRENATAL VIT W/ FE FUMARATE-FA TAB 27-0.8 MG 1 TABLET: 27-0.8 TAB at 08:12

## 2024-04-13 NOTE — LACTATION NOTE
Mother's Name: Julissa Phone #: [phone]  Infant Name: Toby  : 24  Gestation: 36w3  Day of life: 2  Birth weight: 6-1 (2750g)  Discharge weight:5-9.8 (2545g)  Weight Loss: -7.45%  24 hour Summary of Feeds: 5BF 21 ml EBM Voids: 3  Stools: 2   Assistive devices (shields, shells, etc): Attempted feedings with nipple shield through the night due to feeding difficulty.  Significant Maternal history: , Bipolar 1 Disorder, Migraines, THC + , 3/24, and 24  Maternal Concerns: None   Maternal Goal: Breastfeed  Mother's Medications: PNV, ASA  Breastpump for home: Spectra  Ped follow up appt: DARCIE Cordova Monday April 15 at 0900  [facility] Lactation: Monday, April 15 at 1000    F/u with mother to discuss breastfeeding progress. Mother states infant does well to latch but becomes sleepy after first breast. Has been pumping and hand expressing and giving all available EBM. Praise provided for this. Strongly encourage continued plan to offer breast, pump and offer EBM up to 15 mls with each feeding. Also encourage follow up with [facility] lactation next week due to infant's  GA and mother 1st time breastfeeding. Parents agreeable and requesting appt to follow peds appt. Instructions for appt provided. Breastfeeding after discharge handout given and briefly reviewed, encouraged mother to independently read over handout as well. Questions denied at this time.         Instructed patient our lactation team is here for continued support throughout their breastfeeding journey. Our team has encouraged patient to call with any questions or concerns that may arise after discharge.      Signs of Milk: Fullness, firmness, heaviness of breasts, leaking of milk.  Signs of Good Feed: Breast fullness prior to feed, breasts soft and comfortable after feeding. Infant content after feeding: calm, sleepy, relaxed and without continued hunger cues.  Signs of Plugged Ducts, Engorgement and Mastitis: Plugged ducts (milk  entrapment in milk ducts)- small tender knots that often feel like little beans under breast tissue, usually tender. Massage on these areas of concern while breastfeeding or pumping to promote emptying.   Engorgement- fluid or excess milk, breasts become uncomfortably full, tight, firm (compare to the firmness of your cheek (mild), chin (moderate) or forehead (severe). First line of treatment should be to BREASTFEED, if breasts remain full feeling after a feeding, it may be necessary to pump, ONLY UNTIL BREASTS ARE SOFT AND COMFORTABLE. DO NOT OVER PUMP (complete emptying of breasts can trigger even more milk which will cause continued, recurrent Engorgement).  Mastitis- Infection of the breast tissue, most often caused by plugged ducts that are not adequately treated by emptying, recurrent trauma to nipples or breasts (cracked or bleeding nipples). Signs: redness, swelling, tender knots or fever to breasts as well as generalized fever >101 degrees F that is often sudden onset. Treatment of mastitis, BREASTFEED! Pump after breastfeeding to achieve COMPLETE emptying of affected breast, utilizing massage to areas of concern, may use cold compress to affected area only after breast emptying. May take anti-inflammatories i.e. Ibuprofen, Motrin. CALL your OB for assessment and continued treatment with Antibiotics to adequately treat mastitis.  Infant Care: Over the first 2 weeks it is important to keep record of infant's feeding routine (feeding times and durations), wet and dirty diaper frequency, stool color and any spit ups that may occur.  Keep in mind, ALL babies will lose some weight initially (usually no more than 10% by day 3). Until infant returns to/ surpasses birth weight (which can take up to 2 weeks), it is important to offer feedings AT LEAST EVERY 3 HOURS. Remember, if you choose to supplement infant with formula or previously pumped milk, you should always pump in replacement of that feeding in order to  promote and maintain a healthy milk supply!  Maternal Care: REST, sleep when the infant sleeps, stay hydrated (water is optimal) drink to thirst, increase caloric intake - breastfeeding mother's need an ADDITIONAL 500 calories per day , eat 3 meals/day as well as snacks in between, limit CAFFIENE intake to 2 cups/day. Ask your significant other, family members or friends for help when needed, taking advantage of meal trains, allowing others to help with laundry, house chores, etc can help you focus on what is most important early on after delivery… you and your infant, and breastfeeding!   Medications to CONTINUE: Prenatal Vitamins are important to continue taking while breastfeeding. Fish oil, magnesium/calcium supplements often are helpful to support Mothers and their milk supply as well. Tylenol, Ibuprofen, regular Zyrtec, Claritin are SAFE if you suffer from seasonal allergies. Flonase is safe and often an effective medication to take if suffering from sinus drainage/pressure.  Medications to AVOID: Benadryl, Sudafed, any medications including “DM” or have a drying effect to sinus drainage will also dry a mother's milk up. Birth control- your OB will want to address birth control options with you usually around 4-6 weeks postpartum, be sure to notify your MD if you continue to breastfeed as some birth controls may significantly decrease your milk supply. Herbals- some herbs may also decrease your milk supply: PEPPERMINT, MENTHOL in any form (candies, essential oils, teas, etc), so check labels and avoid using in excess.  Pumping: Although we encourage you to focus on breastfeeding over the first 2-4 weeks, you will need to plan to begin pumping. We do recommend implementing pumping by the time infant is 4 weeks old. Pump 2-3 times per day immediately AFTER breastfeeding, it is normal to collect very small amounts initially, but the more consistently you pump, the more you will begin to collect. Store collected  milk in refrigerator or freezer. You should also begin offering infant a bottle around 4 weeks. Remember to use slow flow nipples and PACE the bottle-feed. A bottle feed should take about as long as a breastfeeding session.

## 2024-04-13 NOTE — DISCHARGE SUMMARY
[facility]    Yesenia Castillo MD  [address]  [place] [address]  [phone]      Discharge Summary     Sunita Conner  : 2000  MRN: [id number]  CSN: [id number]    Date of Admission: 4/10/2024   Date of Discharge:  2024   Delivering Physician: Duncan Carmona        Admission Diagnosis: Normal labor [O80, Z37.9]   Discharge Diagnosis: Pregnancy at 36w5d - delivered       Procedures: 2024  - Vaginal, Spontaneous       Hospital Course  Patient is a 24 y.o.  who at 36w5d had a vaginal birth.  Her postpartum course was without complications.  On PPD #2 she was ready for discharge.  She had normal lochia and pain well controlled with oral medications.    Infant  male  fetus weighing 2750 g (6 lb 1 oz)   Apgars -  8 @ 1 minute /  9 @ 5 minutes.    Discharge labs  Lab Results   Component Value Date    WBC 10.64 04/10/2024    HGB 10.8 (L) 2024    HCT 32.0 (L) 2024     04/10/2024       Discharge Medications     Discharge Medications        New Medications        Instructions Start Date   ibuprofen 800 MG tablet  Commonly known as: ADVIL,MOTRIN   800 mg, Oral, Every 6 Hours PRN             Continue These Medications        Instructions Start Date   aspirin 81 MG EC tablet   81 mg, Oral, Daily      docusate sodium 100 MG capsule  Commonly known as: Colace   100 mg, Oral, 2 Times Daily      Easy Feed Electric Breast Pump misc   use as directed.      metoclopramide 10 MG tablet  Commonly known as: Reglan   10 mg, Oral, 4 Times Daily      V[employer]Med[facility] RediChew Rx 1.4 MG chewable tablet   1 tablet, Oral, Daily               External Prenatal Results       Pregnancy Outside Results - Transcribed From Office Records - See Scanned Records For Details       Test Value Date Time    ABO  A  04/10/24 1900    Rh  Positive  04/10/24 1900    Antibody Screen  Negative  04/10/24 1900       Negative  23 1033    Varicella IgG  248 index 23  1033    Rubella  1.42 index 09/21/23 1033    Hgb  10.8 g/dL 04/12/24 0533       11.4 g/dL 04/10/24 1900       12.0 g/dL 02/22/24 1411       14.1 g/dL 09/21/23 1033    Hct  32.0 % 04/12/24 0533       34.7 % 04/10/24 1900       37.2 % 02/22/24 1411       42.9 % 09/21/23 1033    Glucose Fasting GTT       Glucose Tolerance Test 1 hour       Glucose Tolerance Test 3 hour       Gonorrhea (discrete)  Negative  09/21/23 1033    Chlamydia (discrete)  Negative  09/21/23 1033    RPR  Non Reactive  02/22/24 1411       Non Reactive  09/21/23 1033    VDRL       Syphilis Antibody       HBsAg  Negative  02/22/24 1411       Negative  09/21/23 1033    Herpes Simplex Virus PCR       Herpes Simplex VIrus Culture       HIV  Non Reactive  02/22/24 1411       Non Reactive  09/21/23 1033    Hep C RNA Quant PCR       Hep C Antibody  Non Reactive  02/22/24 1411    AFP       Group B Strep       GBS Susceptibility to Clindamycin       GBS Susceptibility to Erythromycin       Fetal Fibronectin       Genetic Testing, Maternal Blood                 Drug Screening       Test Value Date Time    Urine Drug Screen       Amphetamine Screen  Negative  04/10/24 1855       Negative ng/mL 03/06/24 1102    Barbiturate Screen  Negative  04/10/24 1855       Negative ng/mL 03/06/24 1102    Benzodiazepine Screen  Negative  04/10/24 1855       Negative ng/mL 03/06/24 1102    Methadone Screen  Negative  04/10/24 1855       Negative ng/mL 03/06/24 1102    Phencyclidine Screen  Negative  04/10/24 1855       Negative ng/mL 03/06/24 1102    Opiates Screen  Negative  04/10/24 1855    THC Screen  Positive  04/10/24 1855    Cocaine Screen       Propoxyphene Screen  Negative ng/mL 03/06/24 1102    Buprenorphine Screen  Negative  04/10/24 1855    Methamphetamine Screen       Oxycodone Screen  Negative  04/10/24 1855    Tricyclic Antidepressants Screen  Negative  04/10/24 1855              Legend    ^: Historical                            Discharge Disposition Home or  Self Care   Condition on Discharge: good   Follow-up: 2 weeks with Kristine Castillo MD  4/13/2024

## 2024-04-13 NOTE — PROGRESS NOTES
"      Yesenia Castillo MD  [facility]  [address]  [place] [address]  Office [phone]  Fax [phone]    [facility]  Vaginal Delivery Progress Note    Subjective   Postpartum Day 2: Vaginal Delivery    The patient feels well.  Her pain is well controlled with nonsteroidal anti-inflammatory drugs and Tylenol.   She is ambulating well.  Patient describes her bleeding as thin lochia.    .    Objective     Vital Signs Range for the last 24 hours  Temperature: Temp:  [97 °F (36.1 °C)-98.3 °F (36.8 °C)] 98.3 °F (36.8 °C)   Temp Source: Temp src: Temporal   BP: BP: (119-130)/(71-78) 130/78   Pulse: Heart Rate:  [] 102   Respirations: Resp:  [18] 18   SPO2: SpO2:  [99 %] 99 %   O2 Amount (l/min):     O2 Devices Device (Oxygen Therapy): room air   Weight:       Admit Height:  Height: 170.2 cm (67\")      Physical Exam:  General:  no acute distresss.  Abdomen: abdomen is soft without significant tenderness, masses, organomegaly or guarding.  Extremities: normal, atraumatic, no cyanosis, and trace edema.       Lab results reviewed:  Yes   Rubella:  No results found for: \"RUBELLAIGGIN\" Nurse Transcribed from prenatal record --  No components found for: \"EXTRUBELQUAL\"  Rh Status:    RH type   Date Value Ref Range Status   04/10/2024 Positive  Corrected     Comment:     PRIOR ABORH RECORDS NOT AVAILABLE FOR VERIFICATION     Immunizations:   Immunization History   Administered Date(s) Administered    DTaP, Unspecified 2002, 08/23/2006    Fluzone (or Fluarix & Flulaval for VFC) >6mos 11/16/2023    Hep B / HiB 2002    IPV 2002, 08/23/2006    MMR 08/23/2006    PEDS-Pneumococcal Conjugate (PCV7) 2002    Tdap 02/21/2024     Lab Results (last 24 hours)       Procedure Component Value Units Date/Time    Hemoglobin & Hematocrit, Blood [[id number]]  (Abnormal) Collected: 04/12/24 0533    Specimen: Blood Updated: 04/12/24 0542     Hemoglobin 10.8 g/dL      Hematocrit 32.0 %       "       External Prenatal Results       Pregnancy Outside Results - Transcribed From Office Records - See Scanned Records For Details       Test Value Date Time    ABO  A  04/10/24 1900    Rh  Positive  04/10/24 1900    Antibody Screen  Negative  04/10/24 1900       Negative  09/21/23 1033    Varicella IgG  248 index 09/21/23 1033    Rubella  1.42 index 09/21/23 1033    Hgb  10.8 g/dL 04/12/24 0533       11.4 g/dL 04/10/24 1900       12.0 g/dL 02/22/24 1411       14.1 g/dL 09/21/23 1033    Hct  32.0 % 04/12/24 0533       34.7 % 04/10/24 1900       37.2 % 02/22/24 1411       42.9 % 09/21/23 1033    Glucose Fasting GTT       Glucose Tolerance Test 1 hour       Glucose Tolerance Test 3 hour       Gonorrhea (discrete)  Negative  09/21/23 1033    Chlamydia (discrete)  Negative  09/21/23 1033    RPR  Non Reactive  02/22/24 1411       Non Reactive  09/21/23 1033    VDRL       Syphilis Antibody       HBsAg  Negative  02/22/24 1411       Negative  09/21/23 1033    Herpes Simplex Virus PCR       Herpes Simplex VIrus Culture       HIV  Non Reactive  02/22/24 1411       Non Reactive  09/21/23 1033    Hep C RNA Quant PCR       Hep C Antibody  Non Reactive  02/22/24 1411    AFP       Group B Strep       GBS Susceptibility to Clindamycin       GBS Susceptibility to Erythromycin       Fetal Fibronectin       Genetic Testing, Maternal Blood                 Drug Screening       Test Value Date Time    Urine Drug Screen       Amphetamine Screen  Negative  04/10/24 1855       Negative ng/mL 03/06/24 1102    Barbiturate Screen  Negative  04/10/24 1855       Negative ng/mL 03/06/24 1102    Benzodiazepine Screen  Negative  04/10/24 1855       Negative ng/mL 03/06/24 1102    Methadone Screen  Negative  04/10/24 1855       Negative ng/mL 03/06/24 1102    Phencyclidine Screen  Negative  04/10/24 1855       Negative ng/mL 03/06/24 1102    Opiates Screen  Negative  04/10/24 1855    THC Screen  Positive  04/10/24 1855    Cocaine Screen        Propoxyphene Screen  Negative ng/mL 24 1102    Buprenorphine Screen  Negative  04/10/24 185    Methamphetamine Screen       Oxycodone Screen  Negative  04/10/24 185    Tricyclic Antidepressants Screen  Negative  04/10/24 1855              Legend    ^: Historical                            Assessment & Plan       Normal labor     (normal spontaneous vaginal delivery)      Julissa Conner is Day 2  post-partum  Vaginal, Spontaneous   .      Plan:  Discharge home with standard precautions and return to clinic in 2 weeks.      Yesenia Castillo MD  2024  05:28 CDT

## 2024-04-14 NOTE — PAYOR COMM NOTE
"DC HOME 24      Lakia Conner (24 y.o. Female)       Date of Birth   2000    Social Security Number       Address   [address] [place] [address]    Home Phone   [phone]    MRN   [id number]       Spiritism   Other    Marital Status   Single                            Admission Date   4/10/24    Admission Type   Elective    Admitting Provider   Duncan Carmona MD    Attending Provider       Department, Room/Bed   [facility] MOTHER BABY 2A, M221/1       Discharge Date   2024    Discharge Disposition   Home or Self Care    Discharge Destination                                 Attending Provider: (none)   Allergies: No Known Allergies    Isolation: None   Infection: None   Code Status: Prior    Ht: 170.2 cm (67\")   Wt: 134 kg (295 lb)    Admission Cmt: None   Principal Problem: Normal labor [O80,Z37.9]                   Active Insurance as of 4/10/2024       Primary Coverage       Payor Plan Insurance Group Employer/Plan Group    [place] [id number]       Payor Plan Address Payor Plan Phone Number Payor Plan Fax Number Effective Dates    [employer] [address] [address] [phone]  2023 - None Entered    [facility] [id number]         Subscriber Name Subscriber Birth Date Member ID       LAKIA CONNER 2000 [id number]                     Emergency Contacts        (Rel.) Home Phone Work Phone Mobile Phone    HANSEL BRITO (Significant Other) -- -- [phone]                 Discharge Summary        Yesenia Castillo MD at 24 0529          [facility]    Yesenia Castillo MD  [address]  [place] [address]  [phone]      Discharge Summary     [place]  Lakia Conner  : 2000  MRN: [id number]  CSN: [id number]    Date of Admission: 4/10/2024   Date of Discharge:  2024   Delivering Physician: Duncan Carmona        Admission Diagnosis: Normal labor [O80, Z37.9] "   Discharge Diagnosis: Pregnancy at 36w5d - delivered       Procedures: 2024  - Vaginal, Spontaneous       Hospital Course  Patient is a 24 y.o.  who at 36w5d had a vaginal birth.  Her postpartum course was without complications.  On PPD #2 she was ready for discharge.  She had normal lochia and pain well controlled with oral medications.    Infant  male  fetus weighing 2750 g (6 lb 1 oz)   Apgars -  8 @ 1 minute /  9 @ 5 minutes.    Discharge labs  Lab Results   Component Value Date    WBC 10.64 04/10/2024    HGB 10.8 (L) 2024    HCT 32.0 (L) 2024     04/10/2024       Discharge Medications     Discharge Medications        New Medications        Instructions Start Date   ibuprofen 800 MG tablet  Commonly known as: ADVIL,MOTRIN   800 mg, Oral, Every 6 Hours PRN             Continue These Medications        Instructions Start Date   aspirin 81 MG EC tablet   81 mg, Oral, Daily      docusate sodium 100 MG capsule  Commonly known as: Colace   100 mg, Oral, 2 Times Daily      Easy Feed Electric Breast Pump misc   use as directed.      metoclopramide 10 MG tablet  Commonly known as: Reglan   10 mg, Oral, 4 Times Daily      [facility] RediChew Rx 1.4 MG chewable tablet   1 tablet, Oral, Daily               External Prenatal Results       Pregnancy Outside Results - Transcribed From Office Records - See Scanned Records For Details       Test Value Date Time    ABO  A  04/10/24 1900    Rh  Positive  04/10/24 1900    Antibody Screen  Negative  04/10/24 1900       Negative  23 1033    Varicella IgG  248 index 23 1033    Rubella  1.42 index 23 1033    Hgb  10.8 g/dL 24 0533       11.4 g/dL 04/10/24 1900       12.0 g/dL 24 1411       14.1 g/dL 23 1033    Hct  32.0 % 24 0533       34.7 % 04/10/24 1900       37.2 % 24 1411       42.9 % 23 1033    Glucose Fasting GTT       Glucose Tolerance Test 1 hour       Glucose Tolerance Test 3 hour        Gonorrhea (discrete)  Negative  09/21/23 1033    Chlamydia (discrete)  Negative  09/21/23 1033    RPR  Non Reactive  02/22/24 1411       Non Reactive  09/21/23 1033    VDRL       Syphilis Antibody       HBsAg  Negative  02/22/24 1411       Negative  09/21/23 1033    Herpes Simplex Virus PCR       Herpes Simplex VIrus Culture       HIV  Non Reactive  02/22/24 1411       Non Reactive  09/21/23 1033    Hep C RNA Quant PCR       Hep C Antibody  Non Reactive  02/22/24 1411    AFP       Group B Strep       GBS Susceptibility to Clindamycin       GBS Susceptibility to Erythromycin       Fetal Fibronectin       Genetic Testing, Maternal Blood                 Drug Screening       Test Value Date Time    Urine Drug Screen       Amphetamine Screen  Negative  04/10/24 1855       Negative ng/mL 03/06/24 1102    Barbiturate Screen  Negative  04/10/24 1855       Negative ng/mL 03/06/24 1102    Benzodiazepine Screen  Negative  04/10/24 1855       Negative ng/mL 03/06/24 1102    Methadone Screen  Negative  04/10/24 1855       Negative ng/mL 03/06/24 1102    Phencyclidine Screen  Negative  04/10/24 1855       Negative ng/mL 03/06/24 1102    Opiates Screen  Negative  04/10/24 1855    THC Screen  Positive  04/10/24 1855    Cocaine Screen       Propoxyphene Screen  Negative ng/mL 03/06/24 1102    Buprenorphine Screen  Negative  04/10/24 1855    Methamphetamine Screen       Oxycodone Screen  Negative  04/10/24 1855    Tricyclic Antidepressants Screen  Negative  04/10/24 1855              Legend    ^: Historical                            Discharge Disposition Home or Self Care   Condition on Discharge: good   Follow-up: 2 weeks with Kristine Castillo MD  4/13/2024       Electronically signed by Yesenia Castillo MD at 04/13/24 4719

## 2024-04-16 ENCOUNTER — TELEPHONE (OUTPATIENT)
Dept: OBSTETRICS AND GYNECOLOGY | Age: 24
End: 2024-04-16
Payer: MEDICAID

## 2024-04-16 NOTE — TELEPHONE ENCOUNTER
Per Dr Carmona- needs in later this week or earlier next week for a postpartum  BP check. Attempted to call pt and get scheduled, no answer and no vm set up.

## 2024-04-18 LAB
CANNABINOIDS UR QL CFM: NORMAL
CARBOXYTHC/CREAT UR: 10 NG/MG CREAT
LEVEL OF DETECTION:: NORMAL

## 2024-04-22 ENCOUNTER — PATIENT OUTREACH (OUTPATIENT)
Dept: LABOR AND DELIVERY | Facility: HOSPITAL | Age: 24
End: 2024-04-22
Payer: MEDICAID

## 2024-04-22 NOTE — OUTREACH NOTE
Motherhood Connection  Postpartum Check-In    Questions/Answers      Flowsheet Row Responses   Visit Setting Telephone   Best Method for Contacting Cell   OB Discharge Note Reviewed  Reviewed   OB Discharge Navigator Reviewed  Reviewed   OB Discharge Medications Reviewed  Reviewed    discharged home with mother? Yes   Current Pain Levels 0-10 0   At Rest Pain Levels 0-10 0   Pain level with activity 0-10 0   Acceptable Pain Level 0-10 3   Verbalized Emotional State Acceptance   Family/Support Network Family   Level of Involvement in Care Attentive, Interactive, Supportive   Do you feel comfortable in your relationship with your baby? Yes   Have members of your household adjusted to your baby? Yes   Is the baby's father supportive and/or involved with the baby? Yes   How does your partner feel about the baby? Happy, Involved   Do you feel safe at home, school and work? Yes   Are you in a relationship with someone who threatens you or hurts you? No   Do you have the resources to keep yourself and your baby healthy and safe? Yes   Lochia (per patient report) Rubra   Amount Scant   Number of pads per day 3   Lochia Odor None   Is patient breastfeeding? Yes, pumping   Postpartum Depression Screening Education Education Provided   Doctor Appointments: Education Provided   Postpartum Care Education Education Provided   S & S to report Education Provided   Followup Appointments Made Yes   Well Child Visit Appointments Made Yes   Appointment Date 24   Provider/Agency Kristine   Well Child Checkup Provider Name Byron   Well Child Check Up Date: 24   Umbilical Cord No reported signs or symptoms  [fell off]   Was the baby circumcised? Yes   Circumcision care and signs/symptoms to report Reviewed   Feeding Readiness Cues: Cooing, Crying, Eager, Energy for feeding, Finger Sucking   Infant Feeding Method Expressed Breast Milk   Expressed milk PO (mL) 2-4oz   Expressed milk- frequency of feedings every 2-4 hours    Number of wet diapers x 24 hours 10   Last BM x 24 hours 6   Emesis (Unmeasured Occurence) scant   What safe sleep surface is available? Bassinet   Are there stuffed animals, toys, pillows, quilts, blankets, wedges, positioners, bumpers or other loose bedding in the infant's sleeping environment? No   Where does the baby usually sleep? Bassinet   Does the baby ever share a sleep surface with a sibling, adult or pet? No   Does the baby ever share a sleep surface in a bed, couch, recliner or other? No   What position do you place your baby to sleep for naps? Back   What position do you place your baby to sleep at night Back            Review of Systems    Most Recent [place]  Depression Scale Score (EPDS)    Performed by a clinician: 7 (2024  1:09 PM)        5 Ps Screen  completed    Julissa is aware that the RN call center will be calling in 1-2 weeks.     Rox Coleman RN  Maternity Nurse Navigator    2024, 13:12 CDT

## 2024-04-25 ENCOUNTER — POSTPARTUM VISIT (OUTPATIENT)
Dept: OBSTETRICS AND GYNECOLOGY | Age: 24
End: 2024-04-25
Payer: MEDICAID

## 2024-04-25 VITALS
BODY MASS INDEX: 43.95 KG/M2 | SYSTOLIC BLOOD PRESSURE: 136 MMHG | DIASTOLIC BLOOD PRESSURE: 80 MMHG | WEIGHT: 280 LBS | HEIGHT: 67 IN

## 2024-04-25 DIAGNOSIS — Z30.09 ENCOUNTER FOR OTHER GENERAL COUNSELING OR ADVICE ON CONTRACEPTION: ICD-10-CM

## 2024-04-25 NOTE — PROGRESS NOTES
"      Duncan Carmona MD  [facility]  [address]  [place] [address]  Office [phone]  Fax [phone]      [facility]  Julissa Conner  : 2000  MRN: [id number]    Subjective   Subjective     Chief Complaint   Patient presents with    Postpartum Care     Patient here for postpartum visit. Delivered vaginal 24.   Has some questions about episiotomy stitches. Doing well otherwise.        History of Present Illness  Postpartum Visit  Patient is here for a postpartum visit. She is 2 weeks postpartum following a spontaneous vaginal delivery.     Pregnancy complicated by:   IUP at 36w3d  SROM  Labor  Gestational HTN  GBS unknown  THC use in pregnancy  Tobacco use in early pregnancy  Bipolar disorder complicating pregnancy.    Postpartum course has been uncomplicated.     Baby's course has been uncomplicated.     Baby is feeding by breast, no breast complaints. Bleeding thin lochia. Bowel function is normal. Bladder function is normal. Patient is not sexually active. Contraception method is abstinence. Postpartum depression screening: negative. Periurethral lacerations report doing well. Has questions about caring for them. Negative preeclampsia symptoms.     Review of Systems   Genitourinary:  Negative for decreased urine volume, difficulty urinating, dyspareunia, dysuria, enuresis, flank pain, frequency, genital sores, hematuria, menstrual problem, pelvic pain, urgency, vaginal bleeding, vaginal discharge and vaginal pain.   All other systems reviewed and are negative.         Objective    Objective     Vitals:   Visit Vitals  /80   Ht 170.2 cm (67\")   Wt 127 kg (280 lb)   LMP  (LMP Unknown)   Breastfeeding Yes   BMI 43.85 kg/m²        Physical Exam  Vitals and nursing note reviewed.   Constitutional:       General: She is not in acute distress.     Appearance: Normal appearance. She is not ill-appearing.   HENT:      Head: Normocephalic and atraumatic.      Nose: No congestion or " rhinorrhea.   Eyes:      General: No scleral icterus.        Right eye: No discharge.         Left eye: No discharge.      Extraocular Movements: Extraocular movements intact.      Conjunctiva/sclera: Conjunctivae normal.   Pulmonary:      Effort: Pulmonary effort is normal. No accessory muscle usage or respiratory distress.   Abdominal:      General: Abdomen is flat.      Palpations: Abdomen is soft.      Tenderness: There is no abdominal tenderness.   Musculoskeletal:      Right lower leg: No edema.      Left lower leg: No edema.   Skin:     General: Skin is warm and dry.      Coloration: Skin is not ashen, cyanotic or jaundiced.   Neurological:      General: No focal deficit present.      Mental Status: She is alert and oriented to person, place, and time.      Deep Tendon Reflexes: Reflexes normal.   Psychiatric:         Mood and Affect: Mood normal.         Behavior: Behavior is cooperative.           Result Review    Postpartum Depression: Low Risk  (2024)    [place]  Depression Scale     Last EPDS Total Score: 4     Last EPDS Self Harm Result: Not on file   Recent Concern: Postpartum Depression - Medium Risk (2024)    [place]  Depression Scale     Last EPDS Total Score: 7     Last EPDS Self Harm Result: Not on file             Assessment & Plan   Assessment / Plan     Diagnoses and all orders for this visit:    1. Postpartum care following vaginal delivery (Primary)    2. Postpartum hypertension    3. Encounter for other general counseling or advice on contraception      Meeting postpartum milestones.   Blood pressure ok today.   Contraception - options discussed. She reports condoms only for now.   Postpartum restrictions/limitations and care discussed  Pelvic rest advised. No pools/submersion in water for another 4 weeks.       Return in about 4 weeks (around 2024) for postpartum.      Duncan Carmona MD

## 2024-04-28 ENCOUNTER — PATIENT OUTREACH (OUTPATIENT)
Dept: CALL CENTER | Facility: HOSPITAL | Age: 24
End: 2024-04-28
Payer: MEDICAID

## 2024-04-28 NOTE — OUTREACH NOTE
Motherhood Connection Survey      Flowsheet Row Responses   Spiritism facility patient discharged from? [facility]   Week 1 attempt successful? No   Unsuccessful attempts Attempt 2   Reschedule Today              Zahra AVILES - Registered Nurse

## 2024-04-28 NOTE — OUTREACH NOTE
Motherhood Connection Survey      Flowsheet Row Responses   [facility] facility patient discharged from? [facility]   Week 1 attempt successful? No   Unsuccessful attempts Attempt 2   Reschedule Tomorrow              Zahra AVILES - Registered Nurse

## 2024-04-29 ENCOUNTER — PATIENT OUTREACH (OUTPATIENT)
Dept: CALL CENTER | Facility: HOSPITAL | Age: 24
End: 2024-04-29
Payer: MEDICAID

## 2024-04-29 NOTE — OUTREACH NOTE
Motherhood Connection Survey      Flowsheet Row Responses   [facility] patient discharged from? [facility]   Week 1 attempt successful? Yes   Call start time 1424   Call end time 1438   Baby sex Boy    discharged home with mother? Yes   Baby sex Boy   Delivery type Vaginal   Emotional state Acceptance   Family support Yes   Do you have all necessary resources to care for you and your baby?  Yes   Have members of your household adjusted to your baby? Yes   Lochia amount Light   Lochia per patient report Rubra   Did you have an episiotomy/tear/abdominal incision? Yes   Feeding Method Combination   Frequency q1-2 hrs   Duration 30-45 min   Pumping Yes   Supplementing Breast Milk   Frequency after each breastfdg   Amount 2-3oz   Nursing Interventions Lactation education provided   Number of wet diapers x 24 hours 12   Last BM x 24 hours 8-9   Umbilical Cord No reported signs or symptoms   Umbilical cord comments cord off   Was the baby circumcised? Yes   Circumcision care and signs/symptoms to report Reviewed   Circumcision comments healed   Where does the baby usually sleep? Bassinet   Are there stuffed animals, toys, pillows, quilts, blankets, wedges, positioners, bumpers or other loose bedding in the infant's sleeping environment? No   Does the baby ever share a sleep surface in a bed, couch, recliner or other? No   What position do you lay your baby down to sleep? Back   Are you and/or other caregivers smoking inside or outside the baby's home? No   Mom appointment comments: pp f/u done   Baby appointment comments: Peds visit x1   Additional comments eager to eat   Call completed? Yes   How satisfied were you with the Motherhood Connection Program? 5              Denia AVILES - Registered Nurse

## 2024-05-23 ENCOUNTER — POSTPARTUM VISIT (OUTPATIENT)
Dept: OBSTETRICS AND GYNECOLOGY | Age: 24
End: 2024-05-23
Payer: MEDICAID

## 2024-05-23 VITALS
WEIGHT: 286 LBS | HEIGHT: 67 IN | DIASTOLIC BLOOD PRESSURE: 84 MMHG | BODY MASS INDEX: 44.89 KG/M2 | SYSTOLIC BLOOD PRESSURE: 124 MMHG

## 2024-05-23 DIAGNOSIS — Z30.09 ENCOUNTER FOR OTHER GENERAL COUNSELING OR ADVICE ON CONTRACEPTION: ICD-10-CM

## 2024-05-23 NOTE — PROGRESS NOTES
"      Duncan Carmona MD  [facility]  [address]  [place] [address]  Office [phone]  Fax [phone]      [facility]  Julissa Conner  : 2000  MRN: [id number]    Subjective   Subjective     Chief Complaint   Patient presents with    Postpartum Care     Patient here for postpartum visit   Denies questions or concerns today.        History of Present Illness  Postpartum Visit  Patient is here for a postpartum visit. She is 6 weeks postpartum following a spontaneous vaginal delivery.     Pregnancy complicated by:   IUP at 36w3d  SROM  Labor  Gestational HTN  GBS unknown  THC use in pregnancy  Tobacco use in early pregnancy  Bipolar disorder complicating pregnancy.     Postpartum course has been uncomplicated.      Baby's course has been uncomplicated.     Baby is feeding by breast. Bleeding no bleeding. Bowel function is normal. Bladder function is normal. Patient is sexually active. Contraception method is rhythm method. Postpartum depression screening: negative.     Review of Systems   Constitutional:  Negative for activity change, appetite change, chills, fatigue and fever.   Respiratory:  Negative for cough and shortness of breath.    Cardiovascular:  Negative for chest pain and leg swelling.   Gastrointestinal:  Negative for abdominal pain, constipation, diarrhea, nausea and vomiting.   Genitourinary:  Negative for decreased urine volume, dysuria, frequency, menstrual problem, pelvic pain, urgency, vaginal bleeding, vaginal discharge and vaginal pain.   Psychiatric/Behavioral:  Positive for sleep disturbance (new baby). Negative for decreased concentration, dysphoric mood, self-injury and suicidal ideas. The patient is not nervous/anxious.    All other systems reviewed and are negative.         Objective    Objective     Vitals:   Visit Vitals  /84   Ht 170.2 cm (67\")   Wt 130 kg (286 lb)   Breastfeeding Yes   BMI 44.79 kg/m²        Physical Exam  Vitals and nursing note " reviewed.   Constitutional:       General: She is not in acute distress.     Appearance: Normal appearance. She is not ill-appearing.   HENT:      Head: Normocephalic and atraumatic.      Nose: No congestion or rhinorrhea.   Eyes:      General: No scleral icterus.        Right eye: No discharge.         Left eye: No discharge.      Extraocular Movements: Extraocular movements intact.      Conjunctiva/sclera: Conjunctivae normal.   Pulmonary:      Effort: Pulmonary effort is normal. No accessory muscle usage or respiratory distress.   Abdominal:      General: Abdomen is flat.      Palpations: Abdomen is soft.      Tenderness: There is no abdominal tenderness.   Musculoskeletal:      Right lower leg: No edema.      Left lower leg: No edema.   Skin:     General: Skin is warm and dry.      Coloration: Skin is not ashen, cyanotic or jaundiced.   Neurological:      General: No focal deficit present.      Mental Status: She is alert and oriented to person, place, and time.      Deep Tendon Reflexes: Reflexes normal.   Psychiatric:         Mood and Affect: Mood normal.         Behavior: Behavior is cooperative.         Result Review    Postpartum Depression: Medium Risk (2024)    [place]  Depression Scale     Last EPDS Total Score: 6     Last EPDS Self Harm Result: Not on file             Assessment & Plan   Assessment / Plan     Diagnoses and all orders for this visit:    1. Postpartum care following vaginal delivery (Primary)    2. Encounter for other general counseling or advice on contraception      Meeting postpartum milestones  Contraception - offered, patient declined. Rhythm method currently. Ideal pregnancy interval discussed.   Questions discussed. She expressed understanding.     Return in about 3 months (around 2024), or if symptoms worsen or fail to improve, for annual.      Duncan Carmona MD

## 2024-10-01 ENCOUNTER — OFFICE VISIT (OUTPATIENT)
Age: 24
End: 2024-10-01
Payer: MEDICAID

## 2024-10-01 VITALS
DIASTOLIC BLOOD PRESSURE: 82 MMHG | BODY MASS INDEX: 42.87 KG/M2 | WEIGHT: 273.1 LBS | HEIGHT: 67 IN | SYSTOLIC BLOOD PRESSURE: 126 MMHG

## 2024-10-01 DIAGNOSIS — Z00.00 ENCOUNTER FOR ANNUAL PHYSICAL EXAMINATION EXCLUDING GYNECOLOGICAL EXAMINATION IN A PATIENT OLDER THAN 17 YEARS: ICD-10-CM

## 2024-10-01 DIAGNOSIS — Z11.3 SCREENING EXAMINATION FOR STD (SEXUALLY TRANSMITTED DISEASE): Primary | ICD-10-CM

## 2024-10-01 RX ORDER — LURASIDONE HYDROCHLORIDE 20 MG/1
20 TABLET, FILM COATED ORAL DAILY
COMMUNITY
Start: 2024-07-29

## 2024-10-01 NOTE — PROGRESS NOTES
"Renee Conner is a 24 y.o. female presents for annual exam.  Pt is sexually active with her  but has had other partners, as has he.  Would like STD screening. Pt just quit breastfeeding but does not want contraception. Uses plan B at times.  No complaints. Denies vaginal discharge, dyspareunia, pelvic pain. Reports regular menses with normal flow. Denies breast changes. Denies urinary symptoms.     Gynecologic Exam  The patient's pertinent negatives include no pelvic pain or vaginal discharge. Pertinent negatives include no constipation, diarrhea, dysuria, nausea or vomiting.         /82   Ht 170.2 cm (67.01\")   Wt 124 kg (273 lb 1.6 oz)   LMP 09/19/2024 (Approximate)   Breastfeeding No   BMI 42.76 kg/m²     Outpatient Encounter Medications as of 10/1/2024   Medication Sig Dispense Refill    Lurasidone HCl (LATUDA) 20 MG tablet tablet Take 1 tablet by mouth Daily.      [DISCONTINUED] docusate sodium (Colace) 100 MG capsule Take 1 capsule by mouth 2 (Two) Times a Day. 60 capsule 1    [DISCONTINUED] ibuprofen (ADVIL,MOTRIN) 800 MG tablet Take 1 tablet by mouth Every 6 (Six) Hours As Needed for Mild Pain. 30 tablet 0    [DISCONTINUED] metoclopramide (Reglan) 10 MG tablet Take 1 tablet by mouth 4 (Four) Times a Day. 120 tablet 1    [DISCONTINUED] Misc. Devices (Easy Feed Electric Breast Pump) misc use as directed.      [DISCONTINUED] [id number] ([facility] RediChew Rx) 1.4 MG chewable tablet Chew 1 tablet Daily. 30 tablet 12     No facility-administered encounter medications on file as of 10/1/2024.       Surgical History  Past Surgical History:   Procedure Laterality Date    TONSILLECTOMY AND ADENOIDECTOMY      age 18       Family History  Family History   Problem Relation Age of Onset    Breast cancer Neg Hx     Ovarian cancer Neg Hx     Uterine cancer Neg Hx     Colon cancer Neg Hx        The following portions of the patient's history were reviewed and updated as " appropriate: allergies, current medications, past family history, past medical history, past social history, past surgical history, and problem list.    Review of Systems   Constitutional:  Negative for unexpected weight gain and unexpected weight loss.   Respiratory:  Negative for cough, chest tightness, shortness of breath and wheezing.    Cardiovascular:  Negative for chest pain and palpitations.   Gastrointestinal:  Negative for constipation, diarrhea, nausea and vomiting.   Genitourinary:  Negative for breast discharge, breast lump, breast pain, dyspareunia, dysuria, menstrual problem, pelvic pain, pelvic pressure, urinary incontinence, vaginal bleeding, vaginal discharge and vaginal pain.       Objective   Physical Exam  Exam conducted with a chaperone present.   Constitutional:       Appearance: Normal appearance.   Cardiovascular:      Rate and Rhythm: Normal rate and regular rhythm.      Pulses: Normal pulses.   Pulmonary:      Effort: Pulmonary effort is normal.      Breath sounds: Normal breath sounds.   Chest:   Breasts:     Right: Normal. No inverted nipple, mass, nipple discharge or skin change.      Left: Normal. No inverted nipple, mass, nipple discharge or skin change.   Abdominal:      General: Abdomen is flat. Bowel sounds are normal.      Palpations: Abdomen is soft.   Genitourinary:     Comments: Normal external genitalia, vaginal mucosa pink without lesions, cervix smooth without lesions, no prolapse, bimanual exam with anteverted uterus, normal size, no masses, no CMT, nontender, no vaginal discharge  Musculoskeletal:      Cervical back: Normal range of motion and neck supple.   Neurological:      Mental Status: She is alert.   Psychiatric:         Mood and Affect: Mood normal.         Behavior: Behavior normal.         Assessment & Plan   Diagnoses and all orders for this visit:    1. Screening examination for STD (sexually transmitted disease) (Primary)  -     Hepatitis B Surface Antigen  -      Hepatitis C Antibody  -     HIV-1 / O / 2 Ag / Antibody  -     RPR Qualitative with Reflex to Quant  -     HSV 1 & 2 - Specific Antibody, IgG  -     Chlamydia trachomatis, Neisseria gonorrhoeae, PCR w/ confirmation - Swab, Vagina  -     Chlamydia trachomatis, Neisseria gonorrhoeae, Trichomonas vaginalis, PCR - Swab, Cervix    2. Encounter for annual physical examination excluding gynecological examination in a patient older than 17 years  23 y/o CF here for annual exam. Pap smear today. Pt requests STD screening. Labs ordered today. Normal breast and pelvic exam. Defers contraception. Encouraged to stay on a daily PNV.      BMI Body mass index is 42.76 kg/m²..   Colonoscopy: not applicable  Mammogram: not applicable  DEXA:  not applicable  Pap smear, per [facility]P guidelines, Done today  STI screening: desires  Contraception: declines    AVS/Patient education handout on the following as well w/discussion  Encouraged self breast awareness.  Encouraged proactive weight management and importance of maintaining a healthy weight.   Encouraged regular exercise and the importance of same, in regards to a healthy heart as well as helping to maintain her weight and improving her mental health.          Class 3 Severe Obesity (BMI >=40). Obesity-related health conditions include the following: none. Obesity is improving with lifestyle modifications. BMI is is above average; BMI management plan is completed. We discussed portion control and increasing exercise.      Yesenia Castillo MD  10/1/2024

## 2024-10-02 LAB
HBV SURFACE AG SERPL QL IA: NEGATIVE
HCV IGG SERPL QL IA: NON REACTIVE
HIV 1+2 AB+HIV1 P24 AG SERPL QL IA: NON REACTIVE
HSV1 IGG SER IA-ACNC: <0.91 INDEX (ref 0–0.9)
HSV2 IGG SER IA-ACNC: 2.55 INDEX (ref 0–0.9)
RPR SER QL: NON REACTIVE

## 2024-10-03 LAB
C TRACH RRNA SPEC QL NAA+PROBE: NEGATIVE
N GONORRHOEA RRNA SPEC QL NAA+PROBE: NEGATIVE
T VAGINALIS RRNA SPEC QL NAA+PROBE: NEGATIVE